# Patient Record
Sex: FEMALE | Race: WHITE | NOT HISPANIC OR LATINO | Employment: FULL TIME | URBAN - METROPOLITAN AREA
[De-identification: names, ages, dates, MRNs, and addresses within clinical notes are randomized per-mention and may not be internally consistent; named-entity substitution may affect disease eponyms.]

---

## 2018-04-12 ENCOUNTER — HISTORICAL (OUTPATIENT)
Dept: ADMINISTRATIVE | Facility: HOSPITAL | Age: 58
End: 2018-04-12

## 2018-04-12 ENCOUNTER — HISTORICAL (OUTPATIENT)
Dept: LAB | Facility: HOSPITAL | Age: 58
End: 2018-04-12

## 2018-04-12 LAB
APPEARANCE, UA: CLEAR
BACTERIA #/AREA URNS AUTO: ABNORMAL /HPF
BILIRUB UR QL STRIP: NEGATIVE MG/DL
COLOR UR: ABNORMAL
GLUCOSE (UA): NEGATIVE MG/DL
HGB UR QL STRIP: ABNORMAL UNIT/L
KETONES UR QL STRIP: NEGATIVE MG/DL
LEUKOCYTE ESTERASE UR QL STRIP: NEGATIVE UNIT/L
NITRITE UR QL STRIP.AUTO: NEGATIVE
PH UR STRIP: 7 [PH]
PROT UR QL STRIP: NEGATIVE MG/DL
RBC #/AREA URNS HPF: ABNORMAL /HPF
SP GR UR STRIP: <1.005
SQUAMOUS EPITHELIAL, UA: ABNORMAL /LPF
UROBILINOGEN UR STRIP-ACNC: 0.2 MG/DL
WBC #/AREA URNS AUTO: ABNORMAL /[HPF]

## 2018-05-17 ENCOUNTER — HISTORICAL (OUTPATIENT)
Dept: ADMINISTRATIVE | Facility: HOSPITAL | Age: 58
End: 2018-05-17

## 2018-05-17 LAB
ABS NEUT (OLG): 4.9
ALBUMIN SERPL-MCNC: 4.3 GM/DL (ref 3.4–5)
ALBUMIN/GLOB SERPL: 2.15 {RATIO} (ref 1.5–2.5)
ALP SERPL-CCNC: 71 UNIT/L (ref 38–126)
ALT SERPL-CCNC: 20 UNIT/L (ref 7–52)
AST SERPL-CCNC: 19 UNIT/L (ref 15–37)
BILIRUB SERPL-MCNC: 0.8 MG/DL (ref 0.2–1)
BILIRUBIN DIRECT+TOT PNL SERPL-MCNC: 0.2 MG/DL (ref 0–0.5)
BILIRUBIN DIRECT+TOT PNL SERPL-MCNC: 0.6 MG/DL
BUN SERPL-MCNC: 11 MG/DL (ref 7–18)
CALCIUM SERPL-MCNC: 9.2 MG/DL (ref 8.5–10)
CHLORIDE SERPL-SCNC: 105 MMOL/L (ref 98–107)
CHOLEST SERPL-MCNC: 158 MG/DL (ref 0–200)
CHOLEST/HDLC SERPL: 4 {RATIO}
CO2 SERPL-SCNC: 30 MMOL/L (ref 21–32)
CREAT SERPL-MCNC: 0.74 MG/DL (ref 0.6–1.3)
CREAT UR-MCNC: 300 MG/DL
ERYTHROCYTE [DISTWIDTH] IN BLOOD BY AUTOMATED COUNT: 12.5 % (ref 11.5–17)
EST. AVERAGE GLUCOSE BLD GHB EST-MCNC: 140 MG/DL
GLOBULIN SER-MCNC: 2 GM/DL (ref 1.2–3)
GLUCOSE SERPL-MCNC: 157 MG/DL (ref 74–106)
HBA1C MFR BLD: 6.5 % (ref 4.4–6.4)
HCT VFR BLD AUTO: 46.4 % (ref 37–47)
HDLC SERPL-MCNC: 40 MG/DL (ref 35–60)
HGB BLD-MCNC: 15.5 GM/DL (ref 12–16)
LDLC SERPL CALC-MCNC: 100 MG/DL (ref 0–129)
LYMPHOCYTES # BLD AUTO: 2.3 X10(3)/MCL (ref 0.6–3.4)
LYMPHOCYTES NFR BLD AUTO: 29.9 % (ref 13–40)
MCH RBC QN AUTO: 31.2 PG (ref 27–31.2)
MCHC RBC AUTO-ENTMCNC: 33 GM/DL (ref 32–36)
MCV RBC AUTO: 93 FL (ref 80–94)
MICROALBUMIN UR-MCNC: 10 MG/L
MICROALBUMIN/CREAT RATIO PNL UR: <30 MG/GM
MONOCYTES # BLD AUTO: 0.5 X10(3)/MCL (ref 0–1.8)
MONOCYTES NFR BLD AUTO: 6.7 % (ref 0.1–24)
NEUTROPHILS NFR BLD AUTO: 63.4 % (ref 47–80)
PLATELET # BLD AUTO: 198 X10(3)/MCL (ref 130–400)
PMV BLD AUTO: 10.8 FL
POTASSIUM SERPL-SCNC: 5.4 MMOL/L (ref 3.5–5.1)
PROT SERPL-MCNC: 6.3 GM/DL (ref 6.4–8.2)
RBC # BLD AUTO: 4.97 X10(6)/MCL (ref 4.2–5.4)
SODIUM SERPL-SCNC: 139 MMOL/L (ref 136–145)
TRIGL SERPL-MCNC: 117 MG/DL (ref 30–150)
TSH SERPL-ACNC: 1.34 MIU/ML (ref 0.35–4.94)
VLDLC SERPL CALC-MCNC: 23.4 MG/DL
WBC # SPEC AUTO: 7.7 X10(3)/MCL (ref 4.5–11.5)

## 2018-07-12 ENCOUNTER — HISTORICAL (OUTPATIENT)
Dept: RADIOLOGY | Facility: HOSPITAL | Age: 58
End: 2018-07-12

## 2018-07-12 ENCOUNTER — HISTORICAL (OUTPATIENT)
Dept: ADMINISTRATIVE | Facility: HOSPITAL | Age: 58
End: 2018-07-12

## 2018-07-12 LAB
BUN SERPL-MCNC: 14 MG/DL (ref 7–18)
CALCIUM SERPL-MCNC: 9.2 MG/DL (ref 8.5–10)
CHLORIDE SERPL-SCNC: 103 MMOL/L (ref 98–107)
CO2 SERPL-SCNC: 30 MMOL/L (ref 21–32)
CREAT SERPL-MCNC: 0.69 MG/DL (ref 0.6–1.3)
CREAT/UREA NIT SERPL: 20.3
GLUCOSE SERPL-MCNC: 143 MG/DL (ref 74–106)
POTASSIUM SERPL-SCNC: 4.8 MMOL/L (ref 3.5–5.1)
SODIUM SERPL-SCNC: 138 MMOL/L (ref 136–145)

## 2018-10-16 ENCOUNTER — HISTORICAL (OUTPATIENT)
Dept: ADMINISTRATIVE | Facility: HOSPITAL | Age: 58
End: 2018-10-16

## 2018-10-16 ENCOUNTER — HISTORICAL (OUTPATIENT)
Dept: LAB | Facility: HOSPITAL | Age: 58
End: 2018-10-16

## 2018-10-16 LAB
APPEARANCE, UA: ABNORMAL
BACTERIA #/AREA URNS AUTO: ABNORMAL /HPF
BILIRUB UR QL STRIP: ABNORMAL MG/DL
COLOR UR: YELLOW
EST. AVERAGE GLUCOSE BLD GHB EST-MCNC: 151 MG/DL
GLUCOSE (UA): NEGATIVE MG/DL
HBA1C MFR BLD: 6.9 % (ref 4.4–6.4)
HGB UR QL STRIP: ABNORMAL UNIT/L
KETONES UR QL STRIP: NEGATIVE MG/DL
LEUKOCYTE ESTERASE UR QL STRIP: ABNORMAL UNIT/L
NITRITE UR QL STRIP.AUTO: NEGATIVE
PH UR STRIP: 5 [PH]
PROT UR QL STRIP: ABNORMAL MG/DL
RBC #/AREA URNS HPF: ABNORMAL /HPF
SP GR UR STRIP: >1.03
SQUAMOUS EPITHELIAL, UA: ABNORMAL /LPF
TSH SERPL-ACNC: 1.51 MIU/ML (ref 0.35–4.94)
UROBILINOGEN UR STRIP-ACNC: 0.2 MG/DL
WBC #/AREA URNS AUTO: ABNORMAL /[HPF]

## 2018-12-10 ENCOUNTER — HISTORICAL (OUTPATIENT)
Dept: ADMINISTRATIVE | Facility: HOSPITAL | Age: 58
End: 2018-12-10

## 2018-12-10 LAB — POC CREATININE: 0.7 MG/DL (ref 0.6–1.3)

## 2019-09-16 ENCOUNTER — HISTORICAL (OUTPATIENT)
Dept: ADMINISTRATIVE | Facility: HOSPITAL | Age: 59
End: 2019-09-16

## 2019-09-16 LAB
ABS NEUT (OLG): 6.7 X10(3)/MCL (ref 2.1–9.2)
ALBUMIN SERPL-MCNC: 4.5 GM/DL (ref 3.4–5)
ALBUMIN/GLOB SERPL: 1.96 {RATIO} (ref 1.5–2.5)
ALP SERPL-CCNC: 81 UNIT/L (ref 38–126)
ALT SERPL-CCNC: 16 UNIT/L (ref 7–52)
AST SERPL-CCNC: 16 UNIT/L (ref 15–37)
BILIRUB SERPL-MCNC: 0.5 MG/DL (ref 0.2–1)
BILIRUBIN DIRECT+TOT PNL SERPL-MCNC: 0.1 MG/DL (ref 0–0.5)
BILIRUBIN DIRECT+TOT PNL SERPL-MCNC: 0.4 MG/DL
BUN SERPL-MCNC: 13 MG/DL (ref 7–18)
CALCIUM SERPL-MCNC: 9.7 MG/DL (ref 8.5–10)
CHLORIDE SERPL-SCNC: 103 MMOL/L (ref 98–107)
CHOLEST SERPL-MCNC: 170 MG/DL (ref 0–200)
CHOLEST/HDLC SERPL: 3.9 {RATIO}
CO2 SERPL-SCNC: 28 MMOL/L (ref 21–32)
CREAT SERPL-MCNC: 0.81 MG/DL (ref 0.6–1.3)
ERYTHROCYTE [DISTWIDTH] IN BLOOD BY AUTOMATED COUNT: 12.1 % (ref 11.5–17)
EST. AVERAGE GLUCOSE BLD GHB EST-MCNC: 137 MG/DL
GLOBULIN SER-MCNC: 2.3 GM/DL (ref 1.2–3)
GLUCOSE SERPL-MCNC: 156 MG/DL (ref 74–106)
HBA1C MFR BLD: 6.4 % (ref 4.4–6.4)
HCT VFR BLD AUTO: 47.3 % (ref 37–47)
HDLC SERPL-MCNC: 44 MG/DL (ref 35–60)
HGB BLD-MCNC: 16.1 GM/DL (ref 12–16)
LDLC SERPL CALC-MCNC: 128 MG/DL (ref 0–129)
LYMPHOCYTES # BLD AUTO: 3 X10(3)/MCL (ref 0.6–3.4)
LYMPHOCYTES NFR BLD AUTO: 29.5 % (ref 13–40)
MCH RBC QN AUTO: 30.2 PG (ref 27–31.2)
MCHC RBC AUTO-ENTMCNC: 34 GM/DL (ref 32–36)
MCV RBC AUTO: 89 FL (ref 80–94)
MONOCYTES # BLD AUTO: 0.6 X10(3)/MCL (ref 0.1–1.3)
MONOCYTES NFR BLD AUTO: 6 % (ref 0.1–24)
NEUTROPHILS NFR BLD AUTO: 64.5 % (ref 47–80)
PLATELET # BLD AUTO: 179 X10(3)/MCL (ref 130–400)
PMV BLD AUTO: 12.2 FL (ref 9.4–12.4)
POTASSIUM SERPL-SCNC: 5.3 MMOL/L (ref 3.5–5.1)
PROT SERPL-MCNC: 6.8 GM/DL (ref 6.4–8.2)
RBC # BLD AUTO: 5.33 X10(6)/MCL (ref 4.2–5.4)
SODIUM SERPL-SCNC: 141 MMOL/L (ref 136–145)
TRIGL SERPL-MCNC: 121 MG/DL (ref 30–150)
TSH SERPL-ACNC: 1.61 MIU/ML (ref 0.35–4.94)
VLDLC SERPL CALC-MCNC: 24.2 MG/DL
WBC # SPEC AUTO: 10.3 X10(3)/MCL (ref 4.5–11.5)

## 2019-09-27 ENCOUNTER — HISTORICAL (OUTPATIENT)
Dept: RADIOLOGY | Facility: HOSPITAL | Age: 59
End: 2019-09-27

## 2019-09-27 ENCOUNTER — HISTORICAL (OUTPATIENT)
Dept: ADMINISTRATIVE | Facility: HOSPITAL | Age: 59
End: 2019-09-27

## 2019-09-27 LAB
ABS NEUT (OLG): 6.5 X10(3)/MCL (ref 2.1–9.2)
CREAT UR-MCNC: 200 MG/DL
ERYTHROCYTE [DISTWIDTH] IN BLOOD BY AUTOMATED COUNT: 12.4 % (ref 11.5–17)
HCT VFR BLD AUTO: 46.8 % (ref 37–47)
HGB BLD-MCNC: 15.9 GM/DL (ref 12–16)
LYMPHOCYTES # BLD AUTO: 3.1 X10(3)/MCL (ref 0.6–3.4)
LYMPHOCYTES NFR BLD AUTO: 31.6 % (ref 13–40)
MCH RBC QN AUTO: 30.1 PG (ref 27–31.2)
MCHC RBC AUTO-ENTMCNC: 34 GM/DL (ref 32–36)
MCV RBC AUTO: 88 FL (ref 80–94)
MICROALBUMIN UR-MCNC: 30 MG/L
MICROALBUMIN/CREAT RATIO PNL UR: <30 MG/GM
MONOCYTES # BLD AUTO: 0.3 X10(3)/MCL (ref 0.1–1.3)
MONOCYTES NFR BLD AUTO: 3.1 % (ref 0.1–24)
NEUTROPHILS NFR BLD AUTO: 65.3 % (ref 47–80)
PLATELET # BLD AUTO: 201 X10(3)/MCL (ref 130–400)
PMV BLD AUTO: 11.1 FL (ref 9.4–12.4)
POTASSIUM SERPL-SCNC: 4.4 MMOL/L (ref 3.5–5.1)
RBC # BLD AUTO: 5.29 X10(6)/MCL (ref 4.2–5.4)
WBC # SPEC AUTO: 9.9 X10(3)/MCL (ref 4.5–11.5)

## 2020-05-06 ENCOUNTER — HISTORICAL (OUTPATIENT)
Dept: LAB | Facility: HOSPITAL | Age: 60
End: 2020-05-06

## 2020-05-13 ENCOUNTER — HISTORICAL (OUTPATIENT)
Dept: ADMINISTRATIVE | Facility: HOSPITAL | Age: 60
End: 2020-05-13

## 2020-05-13 LAB
ALBUMIN SERPL-MCNC: 4.5 GM/DL (ref 3.4–5)
ALBUMIN/GLOB SERPL: 1.88 {RATIO} (ref 1.5–2.5)
ALP SERPL-CCNC: 95 UNIT/L (ref 38–126)
ALT SERPL-CCNC: 19 UNIT/L (ref 7–52)
AST SERPL-CCNC: 20 UNIT/L (ref 15–37)
BILIRUB SERPL-MCNC: 0.7 MG/DL (ref 0.2–1)
BILIRUBIN DIRECT+TOT PNL SERPL-MCNC: 0.1 MG/DL (ref 0–0.5)
BILIRUBIN DIRECT+TOT PNL SERPL-MCNC: 0.6 MG/DL
BUN SERPL-MCNC: 13 MG/DL (ref 7–18)
CALCIUM SERPL-MCNC: 9.8 MG/DL (ref 8.5–10)
CHLORIDE SERPL-SCNC: 103 MMOL/L (ref 98–107)
CO2 SERPL-SCNC: 31 MMOL/L (ref 21–32)
CREAT SERPL-MCNC: 0.74 MG/DL (ref 0.6–1.3)
EST. AVERAGE GLUCOSE BLD GHB EST-MCNC: 148 MG/DL
GLOBULIN SER-MCNC: 2.4 GM/DL (ref 1.2–3)
GLUCOSE SERPL-MCNC: 151 MG/DL (ref 74–106)
HBA1C MFR BLD: 6.8 % (ref 4.4–6.4)
POTASSIUM SERPL-SCNC: 5.1 MMOL/L (ref 3.5–5.1)
PROT SERPL-MCNC: 6.9 GM/DL (ref 6.4–8.2)
SODIUM SERPL-SCNC: 141 MMOL/L (ref 136–145)

## 2020-07-15 LAB — SARS-COV-2 RNA RESP QL NAA+PROBE: NOT DETECTED

## 2020-07-16 ENCOUNTER — HISTORICAL (OUTPATIENT)
Dept: LAB | Facility: HOSPITAL | Age: 60
End: 2020-07-16

## 2020-11-19 ENCOUNTER — HISTORICAL (OUTPATIENT)
Dept: ADMINISTRATIVE | Facility: HOSPITAL | Age: 60
End: 2020-11-19

## 2020-11-19 LAB
ABS NEUT (OLG): 5.2 X10(3)/MCL (ref 2.1–9.2)
ALBUMIN SERPL-MCNC: 4.4 GM/DL (ref 3.4–5)
ALBUMIN/GLOB SERPL: 2 {RATIO} (ref 1.5–2.5)
ALP SERPL-CCNC: 87 UNIT/L (ref 38–126)
ALT SERPL-CCNC: 25 UNIT/L (ref 7–52)
AST SERPL-CCNC: 27 UNIT/L (ref 15–37)
BILIRUB SERPL-MCNC: 0.8 MG/DL (ref 0.2–1)
BILIRUBIN DIRECT+TOT PNL SERPL-MCNC: 0.2 MG/DL (ref 0–0.5)
BILIRUBIN DIRECT+TOT PNL SERPL-MCNC: 0.6 MG/DL
BUN SERPL-MCNC: 9 MG/DL (ref 7–18)
CALCIUM SERPL-MCNC: 10.1 MG/DL (ref 8.5–10)
CHLORIDE SERPL-SCNC: 103 MMOL/L (ref 98–107)
CHOLEST SERPL-MCNC: 148 MG/DL (ref 0–200)
CHOLEST/HDLC SERPL: 4.2 {RATIO}
CO2 SERPL-SCNC: 31 MMOL/L (ref 21–32)
CREAT SERPL-MCNC: 0.74 MG/DL (ref 0.6–1.3)
CREAT UR-MCNC: 300 MG/DL
ERYTHROCYTE [DISTWIDTH] IN BLOOD BY AUTOMATED COUNT: 12.4 % (ref 11.5–17)
EST. AVERAGE GLUCOSE BLD GHB EST-MCNC: 134 MG/DL
GLOBULIN SER-MCNC: 2.2 GM/DL (ref 1.2–3)
GLUCOSE SERPL-MCNC: 144 MG/DL (ref 74–106)
HBA1C MFR BLD: 6.3 % (ref 4.4–6.4)
HCT VFR BLD AUTO: 45.5 % (ref 37–47)
HDLC SERPL-MCNC: 35 MG/DL (ref 35–60)
HGB BLD-MCNC: 15.7 GM/DL (ref 12–16)
LDLC SERPL CALC-MCNC: 112 MG/DL (ref 0–129)
LYMPHOCYTES # BLD AUTO: 1.9 X10(3)/MCL (ref 0.6–3.4)
LYMPHOCYTES NFR BLD AUTO: 25 % (ref 13–40)
MCH RBC QN AUTO: 31.2 PG (ref 27–31.2)
MCHC RBC AUTO-ENTMCNC: 34 GM/DL (ref 32–36)
MCV RBC AUTO: 90 FL (ref 80–94)
MICROALBUMIN UR-MCNC: 10 MG/L
MICROALBUMIN/CREAT RATIO PNL UR: <30 MG/GM
MONOCYTES # BLD AUTO: 0.4 X10(3)/MCL (ref 0.1–1.3)
MONOCYTES NFR BLD AUTO: 5.7 % (ref 0.1–24)
NEUTROPHILS NFR BLD AUTO: 69.3 % (ref 47–80)
PLATELET # BLD AUTO: 210 X10(3)/MCL (ref 130–400)
PMV BLD AUTO: 11.9 FL (ref 9.4–12.4)
POTASSIUM SERPL-SCNC: 5.1 MMOL/L (ref 3.5–5.1)
PROT SERPL-MCNC: 6.6 GM/DL (ref 6.4–8.2)
RBC # BLD AUTO: 5.04 X10(6)/MCL (ref 4.2–5.4)
SODIUM SERPL-SCNC: 140 MMOL/L (ref 136–145)
TRIGL SERPL-MCNC: 101 MG/DL (ref 30–150)
TSH SERPL-ACNC: 1.1 MIU/ML (ref 0.35–4.94)
VLDLC SERPL CALC-MCNC: 20.2 MG/DL
WBC # SPEC AUTO: 7.5 X10(3)/MCL (ref 4.5–11.5)

## 2021-05-12 ENCOUNTER — HISTORICAL (OUTPATIENT)
Dept: ADMINISTRATIVE | Facility: HOSPITAL | Age: 61
End: 2021-05-12

## 2021-05-12 LAB
ALBUMIN SERPL-MCNC: 4.6 GM/DL (ref 3.4–5)
ALBUMIN/GLOB SERPL: 2.09 {RATIO} (ref 1.5–2.5)
ALP SERPL-CCNC: 85 UNIT/L (ref 38–126)
ALT SERPL-CCNC: 16 UNIT/L (ref 7–52)
AST SERPL-CCNC: 17 UNIT/L (ref 15–37)
BILIRUB SERPL-MCNC: 0.6 MG/DL (ref 0.2–1)
BILIRUBIN DIRECT+TOT PNL SERPL-MCNC: 0.2 MG/DL (ref 0–0.5)
BILIRUBIN DIRECT+TOT PNL SERPL-MCNC: 0.4 MG/DL
BUN SERPL-MCNC: 13 MG/DL (ref 7–18)
CALCIUM SERPL-MCNC: 9.8 MG/DL (ref 8.5–10)
CHLORIDE SERPL-SCNC: 103 MMOL/L (ref 98–107)
CO2 SERPL-SCNC: 30 MMOL/L (ref 21–32)
CREAT SERPL-MCNC: 0.84 MG/DL (ref 0.6–1.3)
EST. AVERAGE GLUCOSE BLD GHB EST-MCNC: 151 MG/DL
GLOBULIN SER-MCNC: 2.2 GM/DL (ref 1.2–3)
GLUCOSE SERPL-MCNC: 115 MG/DL (ref 74–106)
HBA1C MFR BLD: 6.9 % (ref 4.4–6.4)
POTASSIUM SERPL-SCNC: 5.2 MMOL/L (ref 3.5–5.1)
PROT SERPL-MCNC: 6.8 GM/DL (ref 6.4–8.2)
SODIUM SERPL-SCNC: 140 MMOL/L (ref 136–145)

## 2021-11-22 ENCOUNTER — HISTORICAL (OUTPATIENT)
Dept: ADMINISTRATIVE | Facility: HOSPITAL | Age: 61
End: 2021-11-22

## 2021-11-22 LAB
ABS NEUT (OLG): 4.8 X10(3)/MCL (ref 2.1–9.2)
ALBUMIN SERPL-MCNC: 4.5 GM/DL (ref 3.4–5)
ALBUMIN/GLOB SERPL: 1.96 {RATIO} (ref 1.5–2.5)
ALP SERPL-CCNC: 74 UNIT/L (ref 38–126)
ALT SERPL-CCNC: 16 UNIT/L (ref 7–52)
AST SERPL-CCNC: 19 UNIT/L (ref 15–37)
BILIRUB SERPL-MCNC: 0.8 MG/DL (ref 0.2–1)
BILIRUBIN DIRECT+TOT PNL SERPL-MCNC: 0.2 MG/DL (ref 0–0.5)
BILIRUBIN DIRECT+TOT PNL SERPL-MCNC: 0.6 MG/DL
BUN SERPL-MCNC: 14 MG/DL (ref 7–18)
CALCIUM SERPL-MCNC: 9.9 MG/DL (ref 8.5–10)
CHLORIDE SERPL-SCNC: 104 MMOL/L (ref 98–107)
CHOLEST SERPL-MCNC: 163 MG/DL (ref 0–200)
CHOLEST/HDLC SERPL: 4.5 {RATIO}
CO2 SERPL-SCNC: 29 MMOL/L (ref 21–32)
CREAT SERPL-MCNC: 0.81 MG/DL (ref 0.6–1.3)
CREAT UR-MCNC: 200 MG/DL
ERYTHROCYTE [DISTWIDTH] IN BLOOD BY AUTOMATED COUNT: 12.1 % (ref 11.5–17)
EST. AVERAGE GLUCOSE BLD GHB EST-MCNC: 148 MG/DL
GLOBULIN SER-MCNC: 2.3 GM/DL (ref 1.2–3)
GLUCOSE SERPL-MCNC: 146 MG/DL (ref 74–106)
HBA1C MFR BLD: 6.8 % (ref 4.4–6.4)
HBV SURFACE AB SER-ACNC: 10.21 MIU/ML
HBV SURFACE AB SERPL IA-ACNC: ABNORMAL M[IU]/ML
HCT VFR BLD AUTO: 45.6 % (ref 37–47)
HDLC SERPL-MCNC: 36 MG/DL (ref 35–60)
HGB BLD-MCNC: 15.6 GM/DL (ref 12–16)
LDLC SERPL CALC-MCNC: 109 MG/DL (ref 0–129)
LYMPHOCYTES # BLD AUTO: 2.4 X10(3)/MCL (ref 0.6–3.4)
LYMPHOCYTES NFR BLD AUTO: 31.8 % (ref 13–40)
MCH RBC QN AUTO: 30.8 PG (ref 27–31.2)
MCHC RBC AUTO-ENTMCNC: 34 GM/DL (ref 32–36)
MCV RBC AUTO: 90 FL (ref 80–94)
MICROALBUMIN UR-MCNC: 10 MG/L
MICROALBUMIN/CREAT RATIO PNL UR: <30 MG/GM
MONOCYTES # BLD AUTO: 0.3 X10(3)/MCL (ref 0.1–1.3)
MONOCYTES NFR BLD AUTO: 4.2 % (ref 0.1–24)
NEUTROPHILS NFR BLD AUTO: 64 % (ref 47–80)
PLATELET # BLD AUTO: 208 X10(3)/MCL (ref 130–400)
PMV BLD AUTO: 11.3 FL (ref 9.4–12.4)
POTASSIUM SERPL-SCNC: 5.9 MMOL/L (ref 3.5–5.1)
PROT SERPL-MCNC: 6.8 GM/DL (ref 6.4–8.2)
RBC # BLD AUTO: 5.06 X10(6)/MCL (ref 4.2–5.4)
SODIUM SERPL-SCNC: 141 MMOL/L (ref 136–145)
TRIGL SERPL-MCNC: 125 MG/DL (ref 30–150)
TSH SERPL-ACNC: 1.82 MIU/ML (ref 0.35–4.94)
VLDLC SERPL CALC-MCNC: 25 MG/DL
WBC # SPEC AUTO: 7.5 X10(3)/MCL (ref 4.5–11.5)

## 2021-11-30 ENCOUNTER — HISTORICAL (OUTPATIENT)
Dept: RADIOLOGY | Facility: HOSPITAL | Age: 61
End: 2021-11-30

## 2021-12-07 ENCOUNTER — HISTORICAL (OUTPATIENT)
Dept: ADMINISTRATIVE | Facility: HOSPITAL | Age: 61
End: 2021-12-07

## 2021-12-07 LAB — POTASSIUM SERPL-SCNC: 5.5 MMOL/L (ref 3.5–5.1)

## 2022-01-12 ENCOUNTER — HISTORICAL (OUTPATIENT)
Dept: ADMINISTRATIVE | Facility: HOSPITAL | Age: 62
End: 2022-01-12

## 2022-01-12 LAB
BUN SERPL-MCNC: 14 MG/DL (ref 7–18)
CALCIUM SERPL-MCNC: 10.1 MG/DL (ref 8.5–10)
CHLORIDE SERPL-SCNC: 104 MMOL/L (ref 98–107)
CO2 SERPL-SCNC: 32 MMOL/L (ref 21–32)
CREAT SERPL-MCNC: 0.78 MG/DL (ref 0.6–1.3)
CREAT/UREA NIT SERPL: 17.9
GLUCOSE SERPL-MCNC: 150 MG/DL (ref 74–106)
POTASSIUM SERPL-SCNC: 5.6 MMOL/L (ref 3.5–5.1)
SODIUM SERPL-SCNC: 142 MMOL/L (ref 136–145)

## 2022-01-13 ENCOUNTER — HISTORICAL (OUTPATIENT)
Dept: ADMINISTRATIVE | Facility: HOSPITAL | Age: 62
End: 2022-01-13

## 2022-01-13 LAB
CORTIS SERPL-SCNC: 6.6 UG/DL
OSMOLALITY SERPL: 299 MOSM/KG (ref 280–300)
OSMOLALITY UR: 926 MOSM/KG (ref 300–1300)
POTASSIUM SERPL-SCNC: 4.6 MMOL/L (ref 3.5–5.1)
POTASSIUM UR-SCNC: 64 MMOL/L

## 2022-01-20 ENCOUNTER — PATIENT MESSAGE (OUTPATIENT)
Dept: ADMINISTRATIVE | Facility: OTHER | Age: 62
End: 2022-01-20

## 2022-01-20 PROBLEM — E11.9 TYPE 2 DIABETES MELLITUS: Status: ACTIVE | Noted: 2022-01-20

## 2022-01-20 PROBLEM — F41.1 GENERALIZED ANXIETY DISORDER: Status: ACTIVE | Noted: 2022-01-20

## 2022-01-20 PROBLEM — Z72.0 TOBACCO USER: Status: ACTIVE | Noted: 2022-01-20

## 2022-01-20 PROBLEM — M19.031 OSTEOARTHRITIS OF RIGHT WRIST: Status: ACTIVE | Noted: 2022-01-20

## 2022-04-11 ENCOUNTER — HISTORICAL (OUTPATIENT)
Dept: ADMINISTRATIVE | Facility: HOSPITAL | Age: 62
End: 2022-04-11

## 2022-04-13 ENCOUNTER — PATIENT MESSAGE (OUTPATIENT)
Dept: OTHER | Facility: OTHER | Age: 62
End: 2022-04-13

## 2022-04-25 VITALS
OXYGEN SATURATION: 96 % | DIASTOLIC BLOOD PRESSURE: 78 MMHG | WEIGHT: 255.31 LBS | HEIGHT: 70 IN | BODY MASS INDEX: 36.55 KG/M2 | SYSTOLIC BLOOD PRESSURE: 132 MMHG

## 2022-05-23 PROBLEM — G89.29 CHRONIC PAIN OF BOTH SHOULDERS: Status: ACTIVE | Noted: 2022-05-23

## 2022-05-23 PROBLEM — F32.A DEPRESSION: Status: ACTIVE | Noted: 2022-05-23

## 2022-05-23 PROBLEM — M25.511 CHRONIC PAIN OF BOTH SHOULDERS: Status: ACTIVE | Noted: 2022-05-23

## 2022-05-23 PROBLEM — M25.559 ARTHRALGIA OF HIP: Status: ACTIVE | Noted: 2022-05-23

## 2022-05-23 PROBLEM — Z72.0 TOBACCO USER: Status: RESOLVED | Noted: 2022-01-20 | Resolved: 2022-05-23

## 2022-05-23 PROBLEM — M25.512 CHRONIC PAIN OF BOTH SHOULDERS: Status: ACTIVE | Noted: 2022-05-23

## 2022-05-23 PROBLEM — E87.5 HYPERKALEMIA: Status: ACTIVE | Noted: 2022-05-23

## 2022-05-23 PROBLEM — E66.01 SEVERE OBESITY (BMI 35.0-39.9) WITH COMORBIDITY: Status: ACTIVE | Noted: 2022-05-23

## 2022-07-25 ENCOUNTER — OCCUPATIONAL HEALTH (OUTPATIENT)
Dept: URGENT CARE | Facility: CLINIC | Age: 62
End: 2022-07-25
Payer: COMMERCIAL

## 2022-07-25 DIAGNOSIS — R05.9 COUGH: Primary | ICD-10-CM

## 2022-07-25 DIAGNOSIS — U07.1 COVID-19 VIRUS DETECTED: ICD-10-CM

## 2022-07-25 DIAGNOSIS — R05.9 COUGH: ICD-10-CM

## 2022-07-25 LAB
CTP QC/QA: YES
SARS-COV-2 RDRP RESP QL NAA+PROBE: POSITIVE

## 2022-07-25 PROCEDURE — U0002 COVID-19 LAB TEST NON-CDC: HCPCS | Mod: PBBFAC

## 2022-07-25 PROCEDURE — 99211 OFF/OP EST MAY X REQ PHY/QHP: CPT | Mod: PBBFAC

## 2022-07-25 NOTE — PROGRESS NOTES
Pt presented through [facility] for COVID screening. Swabbed, testing ran, resulted. Results printed and handed to [facility].

## 2022-07-26 ENCOUNTER — NURSE TRIAGE (OUTPATIENT)
Dept: ADMINISTRATIVE | Facility: CLINIC | Age: 62
End: 2022-07-26
Payer: COMMERCIAL

## 2022-07-26 NOTE — TELEPHONE ENCOUNTER
OOC RN [facility]SM COVID POSITIVE 7/25/22    Fever running 101-102.  But can break fever with tylenol.  Coughing, drinking. Tea.   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  For any new or worsening.  Symptoms to call back OOC RN.  Patient VU.          Reason for Disposition   [1] COVID-19 diagnosed by positive lab test (e.g., PCR, rapid self-test kit) AND [2] mild symptoms (e.g., cough, fever, others) AND [3] no complications or SOB    Additional Information   Negative: SEVERE difficulty breathing (e.g., struggling for each breath, speaks in single words)   Negative: Difficult to awaken or acting confused (e.g., disoriented, slurred speech)   Negative: Bluish (or gray) lips or face now   Negative: Shock suspected (e.g., cold/pale/clammy skin, too weak to stand, low BP, rapid pulse)   Negative: Sounds like a life-threatening emergency to the triager   Negative: SEVERE or constant chest pain or pressure  (Exception: Mild central chest pain, present only when coughing.)   Negative: MODERATE difficulty breathing (e.g., speaks in phrases, SOB even at rest, pulse 100-120)   Negative: Headache and stiff neck (can't touch chin to chest)   Negative: Oxygen level (e.g., pulse oximetry) 90 percent or lower   Negative: Chest pain or pressure   Negative: Patient sounds very sick or weak to the triager   Negative: MILD difficulty breathing (e.g., minimal/no SOB at rest, SOB with walking, pulse <100)   Negative: Fever > 103 F (39.4 C)   Negative: [1] Fever > 101 F (38.3 C) AND [2] over 60 years of age   Negative: [1] Fever > 100.0 F (37.8 C) AND [2] bedridden (e.g., nursing home patient, CVA, chronic illness, recovering from surgery)   Negative: HIGH RISK for severe COVID complications (e.g., weak immune system, age > 64 years, obesity with BMI > 25, pregnant, chronic lung disease or other chronic medical condition) (Exception: Already seen by PCP and no new or worsening symptoms.)   Negative: [1] HIGH  RISK patient AND [2] influenza is widespread in the community AND [3] ONE OR MORE respiratory symptoms: cough, sore throat, runny or stuffy nose   Negative: [1] HIGH RISK patient AND [2] influenza exposure within the last 7 days AND [3] ONE OR MORE respiratory symptoms: cough, sore throat, runny or stuffy nose   Negative: Oxygen level (e.g., pulse oximetry) 91 to 94 percent   Negative: [1] COVID-19 infection suspected by caller or triager AND [2] mild symptoms (cough, fever, or others) AND [3] negative COVID-19 rapid test   Negative: Fever present > 3 days (72 hours)   Negative: [1] Fever returns after gone for over 24 hours AND [2] symptoms worse or not improved   Negative: [1] Continuous (nonstop) coughing interferes with work or school AND [2] no improvement using cough treatment per Care Advice   Negative: Cough present > 3 weeks   Negative: [1] COVID-19 diagnosed by positive lab test (e.g., PCR, rapid self-test kit) AND [2] NO symptoms (e.g., cough, fever, others)    Protocols used: CORONAVIRUS (COVID-19) DIAGNOSED OR [id number]

## 2022-11-10 ENCOUNTER — OFFICE VISIT (OUTPATIENT)
Dept: ORTHOPEDICS | Facility: CLINIC | Age: 62
End: 2022-11-10
Payer: COMMERCIAL

## 2022-11-10 VITALS — BODY MASS INDEX: 35 KG/M2 | WEIGHT: 250 LBS | HEIGHT: 71 IN

## 2022-11-10 DIAGNOSIS — M25.512 CHRONIC LEFT SHOULDER PAIN: ICD-10-CM

## 2022-11-10 DIAGNOSIS — M75.112 NONTRAUMATIC INCOMPLETE TEAR OF LEFT ROTATOR CUFF: Primary | ICD-10-CM

## 2022-11-10 DIAGNOSIS — M19.012 ARTHROSIS OF LEFT ACROMIOCLAVICULAR JOINT: ICD-10-CM

## 2022-11-10 DIAGNOSIS — G89.29 CHRONIC LEFT SHOULDER PAIN: ICD-10-CM

## 2022-11-10 PROCEDURE — 1159F MED LIST DOCD IN RCRD: CPT | Mod: CPTII,,, | Performed by: ORTHOPAEDIC SURGERY

## 2022-11-10 PROCEDURE — 1160F RVW MEDS BY RX/DR IN RCRD: CPT | Mod: CPTII,,, | Performed by: ORTHOPAEDIC SURGERY

## 2022-11-10 PROCEDURE — 1160F PR REVIEW ALL MEDS BY PRESCRIBER/CLIN PHARMACIST DOCUMENTED: ICD-10-PCS | Mod: CPTII,,, | Performed by: ORTHOPAEDIC SURGERY

## 2022-11-10 PROCEDURE — 99204 OFFICE O/P NEW MOD 45 MIN: CPT | Mod: ,,, | Performed by: ORTHOPAEDIC SURGERY

## 2022-11-10 PROCEDURE — 3008F BODY MASS INDEX DOCD: CPT | Mod: CPTII,,, | Performed by: ORTHOPAEDIC SURGERY

## 2022-11-10 PROCEDURE — 1159F PR MEDICATION LIST DOCUMENTED IN MEDICAL RECORD: ICD-10-PCS | Mod: CPTII,,, | Performed by: ORTHOPAEDIC SURGERY

## 2022-11-10 PROCEDURE — 3008F PR BODY MASS INDEX (BMI) DOCUMENTED: ICD-10-PCS | Mod: CPTII,,, | Performed by: ORTHOPAEDIC SURGERY

## 2022-11-10 PROCEDURE — 99204 PR OFFICE/OUTPT VISIT, NEW, LEVL IV, 45-59 MIN: ICD-10-PCS | Mod: ,,, | Performed by: ORTHOPAEDIC SURGERY

## 2022-11-10 NOTE — PROGRESS NOTES
History of present illness:    This is a 61 y.o. year old female This is a patient that present today with shoulder pain.  Patient has had this shoulder pain for several months. This shoulder pain is moderate to severe. Patient reports increased pain with overhead movement. Patient reports night pain. Patient reports anterolateral shoulder pain that radiates down the arm. Patient has been treated previously with medication, bracing, injections, therapy.    Past Medical History:   Diagnosis Date    Depression     Hypertension        Past Surgical History:   Procedure Laterality Date    COLONOSCOPY  05/15/2013    Extraction of wisdom tooth      HYSTERECTOMY  1998    Hysterectomy (1998)      Repair Upper Lip, External Approach (05/18/2018)      Termination of ectopic pregnancy      TONSILLECTOMY         Current Outpatient Medications   Medication Sig    sertraline (ZOLOFT) 50 MG tablet Take 50 mg by mouth once daily.     No current facility-administered medications for this visit.       Review of patient's allergies indicates:   Allergen Reactions    Azithromycin      Other reaction(s): VOMITING    Metformin Other (See Comments)     Headache       Family History   Problem Relation Age of Onset    Thyroid disease Mother     Diabetes Mother     Atrial fibrillation Mother     Depression Mother     Osteoarthritis Mother     Kidney disease Mother     Mental illness Mother     Vision loss Mother         Macular degeneration    Osteoarthritis Father     Hypertension Father     Asthma Father     Coronary artery disease Father     Arthritis Father     Heart disease Father     Depression Sister     Asthma Sister     Mental illness Sister        Social History     Socioeconomic History    Marital status:      Spouse name: Adeel    Number of children: 0   Occupational History    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   Tobacco Use    Smoking status: Former     Packs/day: 0.50     Years: 38.00     Pack years: 19.00     Types:  "Cigarettes     Start date: 1983     Quit date: 2021     Years since quittin.9    Smokeless tobacco: Never   Substance and Sexual Activity    Alcohol use: Yes     Comment: 3 times per year    Drug use: Never    Sexual activity: Not Currently     Partners: Male     Birth control/protection: Post-menopausal       Chief Complaint:   Chief Complaint   Patient presents with    Left Shoulder - Pain    Shoulder Pain     L shoulder pain, PCP said it was a bone spur, had cortisone shot last week/helped for 24hrs, reports burning/soreness/sharp, limited ROM, pain has persisted for 4 years, icing/heating/ultram/marijuana gummies/PT(Patient is a recreational PT), unable to get vitals       Consulting Physician: Ben Evans Jr., MD      Review of Systems:    All review of systems negative except for those stated in the HPI    Examination:    Vital Signs:    Vitals:    11/10/22 0805   Weight: 113.4 kg (250 lb)   Height: 5' 11" (1.803 m)   PainSc:   8       Body mass index is 34.87 kg/m².    Physical Exam:   General: Well-developed, well-nourished.  Neuro: Alert and oriented x 3.  Psych: Normal mood and affect.  Shoulder Exam:  No obvious deformity. No medial or lateral scapula winging. Forward flexion to 170 degrees and abduction to 170 degrees. Positive empty can,  positive Whipple, Positive drop arm test, Ballard, impingement, Positive AC joint tenderness.  Positive biceps groove tenderness, Positive Dejesus´s, Yergason´s, Speed test. Negative Apprehension and Relocation test. 4/5 strength, normal skin appearance and palpable pulses distally. Sensibility normal.      Imaging: X-rays ordered and images interpreted today personally by me of previous films they demonstrate some acromioclavicular arthrosis.        Assessment: Nontraumatic incomplete tear of left rotator cuff    Chronic left shoulder pain  -     Ambulatory referral/consult to Orthopedics  -     MRI Shoulder Without Contrast Left; Future; Expected " date: 11/10/2022    Arthrosis of left acromioclavicular joint      Plan:    This patient has not improved with conservative treatment.  For this reason it is imperative that we proceed with an MRI of her left shoulder.  This will be a roadmap for surgery.            DISCLAIMER: This note may have been dictated using voice recognition software and may contain grammatical errors.     NOTE: Consult report sent to referring provider via [employer].

## 2022-11-15 PROBLEM — M25.552 LEFT HIP PAIN: Status: ACTIVE | Noted: 2022-05-23

## 2022-11-17 ENCOUNTER — HOSPITAL ENCOUNTER (OUTPATIENT)
Dept: RADIOLOGY | Facility: HOSPITAL | Age: 62
Discharge: HOME | End: 2022-11-17
Attending: ORTHOPAEDIC SURGERY
Payer: COMMERCIAL

## 2022-11-17 ENCOUNTER — OFFICE VISIT (OUTPATIENT)
Dept: ORTHOPEDICS | Facility: CLINIC | Age: 62
End: 2022-11-17
Payer: COMMERCIAL

## 2022-11-17 ENCOUNTER — ANESTHESIA EVENT (OUTPATIENT)
Dept: SURGERY | Facility: HOSPITAL | Age: 62
End: 2022-11-17
Payer: COMMERCIAL

## 2022-11-17 VITALS — BODY MASS INDEX: 34.5 KG/M2 | HEIGHT: 70 IN | WEIGHT: 241 LBS

## 2022-11-17 DIAGNOSIS — M19.012 ARTHROSIS OF LEFT ACROMIOCLAVICULAR JOINT: ICD-10-CM

## 2022-11-17 DIAGNOSIS — M75.22 LEFT BICIPITAL TENOSYNOVITIS: ICD-10-CM

## 2022-11-17 DIAGNOSIS — M67.819 TENDINOSIS OF ROTATOR CUFF: Primary | ICD-10-CM

## 2022-11-17 DIAGNOSIS — M67.819 TENDINOSIS OF ROTATOR CUFF: ICD-10-CM

## 2022-11-17 PROCEDURE — 71046 X-RAY EXAM CHEST 2 VIEWS: CPT | Mod: TC

## 2022-11-17 PROCEDURE — 99214 PR OFFICE/OUTPT VISIT, EST, LEVL IV, 30-39 MIN: ICD-10-PCS | Mod: ,,, | Performed by: ORTHOPAEDIC SURGERY

## 2022-11-17 PROCEDURE — 1160F RVW MEDS BY RX/DR IN RCRD: CPT | Mod: CPTII,,, | Performed by: ORTHOPAEDIC SURGERY

## 2022-11-17 PROCEDURE — 1159F PR MEDICATION LIST DOCUMENTED IN MEDICAL RECORD: ICD-10-PCS | Mod: CPTII,,, | Performed by: ORTHOPAEDIC SURGERY

## 2022-11-17 PROCEDURE — 99214 OFFICE O/P EST MOD 30 MIN: CPT | Mod: ,,, | Performed by: ORTHOPAEDIC SURGERY

## 2022-11-17 PROCEDURE — 3008F PR BODY MASS INDEX (BMI) DOCUMENTED: ICD-10-PCS | Mod: CPTII,,, | Performed by: ORTHOPAEDIC SURGERY

## 2022-11-17 PROCEDURE — 1159F MED LIST DOCD IN RCRD: CPT | Mod: CPTII,,, | Performed by: ORTHOPAEDIC SURGERY

## 2022-11-17 PROCEDURE — 1160F PR REVIEW ALL MEDS BY PRESCRIBER/CLIN PHARMACIST DOCUMENTED: ICD-10-PCS | Mod: CPTII,,, | Performed by: ORTHOPAEDIC SURGERY

## 2022-11-17 PROCEDURE — 3008F BODY MASS INDEX DOCD: CPT | Mod: CPTII,,, | Performed by: ORTHOPAEDIC SURGERY

## 2022-11-17 RX ORDER — GABAPENTIN 100 MG/1
600 CAPSULE ORAL
Status: CANCELLED | OUTPATIENT
Start: 2022-11-17

## 2022-11-17 RX ORDER — ACETAMINOPHEN 500 MG
1000 TABLET ORAL
Status: CANCELLED | OUTPATIENT
Start: 2022-11-17

## 2022-11-17 RX ORDER — SCOLOPAMINE TRANSDERMAL SYSTEM 1 MG/1
1 PATCH, EXTENDED RELEASE TRANSDERMAL ONCE AS NEEDED
Status: CANCELLED | OUTPATIENT
Start: 2022-11-17 | End: 2034-04-15

## 2022-11-17 RX ORDER — NAPROXEN SODIUM 220 MG/1
162 TABLET, FILM COATED ORAL 2 TIMES DAILY
Status: DISCONTINUED | OUTPATIENT
Start: 2022-11-17 | End: 2022-11-30 | Stop reason: HOSPADM

## 2022-11-17 RX ORDER — GABAPENTIN 100 MG/1
300 CAPSULE ORAL
Status: CANCELLED | OUTPATIENT
Start: 2022-11-17

## 2022-11-17 RX ORDER — KETOROLAC TROMETHAMINE 10 MG/1
10 TABLET, FILM COATED ORAL
Status: CANCELLED | OUTPATIENT
Start: 2022-11-30 | End: 2022-11-30

## 2022-11-17 NOTE — H&P (VIEW-ONLY)
History of present illness:    This is a 61 y.o. year old female This is a patient that present today with shoulder pain.  Patient has had this shoulder pain for several months. This shoulder pain is moderate to severe. Patient reports increased pain with overhead movement. Patient reports night pain. Patient reports anterolateral shoulder pain that radiates down the arm. Patient has been treated previously with medication, bracing, injections, therapy.  11/17/2022 this patient comes in to review her left shoulder MRI demonstrates severe rotator cuff tendinosis and thinning with some severe acromioclavicular arthrosis and impingement.  She also complains of anterior shoulder pain.    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    traMADoL (ULTRAM) 50 mg tablet Take 1 tablet (50 mg total) by mouth every 6 (six) hours as needed for Pain.     Current Facility-Administered Medications   Medication    aspirin chewable tablet 162 mg       Review of patient's allergies indicates:   Allergen Reactions    Azithromycin      Other reaction(s): VOMITING    Metformin Other (See Comments)     Headache       Family History   Problem Relation Age of Onset    Thyroid disease Mother     Diabetes Mother     Atrial fibrillation Mother     Depression Mother     Osteoarthritis Mother     Kidney disease Mother     Mental illness Mother     Vision loss Mother         Macular  "degeneration    Osteoarthritis Father     Hypertension Father     Asthma Father     Coronary artery disease Father     Arthritis Father     Heart disease Father     Depression Sister     Asthma Sister     Mental illness Sister        Social History     Socioeconomic History    Marital status:      Spouse name: Adeel    Number of children: 0   Occupational History    Occupation: [employer]   Tobacco Use    Smoking status: Former     Packs/day: 0.50     Years: 38.00     Pack years: 19.00     Types: Cigarettes     Start date: 1983     Quit date: 2021     Years since quittin.9    Smokeless tobacco: Never   Substance and Sexual Activity    Alcohol use: Yes     Comment: 3 times per year    Drug use: Never    Sexual activity: Not Currently     Partners: Male     Birth control/protection: Post-menopausal       Chief Complaint:   Chief Complaint   Patient presents with    Left Shoulder - Pain    Left Hip - Pain    Results     MRI results Left shoulder    Hip Pain     would like to dicuss Left hip problems from an injury a few years ago and its started to shooting down into her knee would like to discuss that, had an XR/injection in her hip on tuesday 11/15/22       Consulting Physician: No ref. provider found      Review of Systems:    All review of systems negative except for those stated in the HPI    Examination:    Vital Signs:    Vitals:    22 0812   Weight: 109.3 kg (241 lb)   Height: 5' 10" (1.778 m)       Body mass index is 34.58 kg/m².    Physical Exam:   General: Well-developed, well-nourished.  Neuro: Alert and oriented x 3.  Psych: Normal mood and affect.  Shoulder Exam:  No obvious deformity. No medial or lateral scapula winging. Forward flexion to 170 degrees and abduction to 170 degrees. Positive empty can,  positive Whipple, Positive drop arm test, Ballard, impingement, Positive AC joint tenderness.  Positive biceps groove tenderness, Positive Dejesus´s, Yergason´s, Speed " test. Negative Apprehension and Relocation test. 4/5 strength, normal skin appearance and palpable pulses distally. Sensibility normal.      Imaging: X-rays ordered and images interpreted today personally by me of previous films they demonstrate some acromioclavicular arthrosis.        Assessment: Tendinosis of rotator cuff  -     Place in Outpatient; Standing  -     Vital signs; Standing  -     Insert peripheral IV; Standing  -     Clip and Prep Other (please specifiy) (Operative site); Standing  -     Cleanse with Chlorhexidine (CHG); Standing  -     Diet NPO; Standing  -     POCT glucose; Standing  -     CBC auto differential; Future  -     Comprehensive metabolic panel; Future  -     X-Ray Chest PA And Lateral; Future; Expected date: 11/17/2022  -     EKG 12-lead; Future  -     Inpatient consult to Anesthesiology; Standing  -     Case Request Operating Room: ARTHROSCOPY,SHOULDER,WITH BICEPS TENODESIS, ARTHROSCOPY, SHOULDER, WITH DISTAL CLAVICLE EXCISION, DEBRIDEMENT, SHOULDER, ARTHROSCOPIC    Left bicipital tenosynovitis  -     Place in Outpatient; Standing  -     Vital signs; Standing  -     Insert peripheral IV; Standing  -     Clip and Prep Other (please specifiy) (Operative site); Standing  -     Cleanse with Chlorhexidine (CHG); Standing  -     Diet NPO; Standing  -     POCT glucose; Standing  -     CBC auto differential; Future  -     Comprehensive metabolic panel; Future  -     X-Ray Chest PA And Lateral; Future; Expected date: 11/17/2022  -     EKG 12-lead; Future  -     Inpatient consult to Anesthesiology; Standing  -     Case Request Operating Room: ARTHROSCOPY,SHOULDER,WITH BICEPS TENODESIS, ARTHROSCOPY, SHOULDER, WITH DISTAL CLAVICLE EXCISION, DEBRIDEMENT, SHOULDER, ARTHROSCOPIC    Arthrosis of left acromioclavicular joint  -     Place in Outpatient; Standing  -     Vital signs; Standing  -     Insert peripheral IV; Standing  -     Clip and Prep Other (please specifiy) (Operative site); Standing  -      Cleanse with Chlorhexidine (CHG); Standing  -     Diet NPO; Standing  -     POCT glucose; Standing  -     CBC auto differential; Future  -     Comprehensive metabolic panel; Future  -     X-Ray Chest PA And Lateral; Future; Expected date: 11/17/2022  -     EKG 12-lead; Future  -     Inpatient consult to Anesthesiology; Standing  -     Case Request Operating Room: ARTHROSCOPY,SHOULDER,WITH BICEPS TENODESIS, ARTHROSCOPY, SHOULDER, WITH DISTAL CLAVICLE EXCISION, DEBRIDEMENT, SHOULDER, ARTHROSCOPIC    Other orders  -     IP VTE LOW RISK PATIENT; Standing  -     ceFAZolin (ANCEF) 2,732.5 mg in dextrose 5 % 50 mL IVPB  -     acetaminophen tablet 1,000 mg  -     ketorolac tablet 10 mg  -     gabapentin capsule 600 mg  -     gabapentin capsule 300 mg  -     scopolamine 1.3-1.5 mg (1 mg over 3 days) 1 patch  -     aspirin chewable tablet 162 mg        Plan:    After much discussion today, we have decided to proceed with surgical intervention.  The goal be to do a left shoulder arthroscopy procedure with rotator cuff debridement and PRP injection into the rotator cuff with a subacromial decompression, distal clavicle resection, and possible biceps tenodesis. A surgical video explaining the surgery and the risk was reviewed by the patient.  The surgical procedure was explained to the patient in detail.  The patient acknowledges that they understood the risks, benefits, and other alternatives.  Patient signed an informed consent.  This patient will need a postoperative abduction pillow sling to unload the rotator cuff, labrum, and biceps tendon repair so that patient can heal properly.            DISCLAIMER: This note may have been dictated using voice recognition software and may contain grammatical errors.     NOTE: Consult report sent to referring provider via [employer].

## 2022-11-17 NOTE — PROGRESS NOTES
History of present illness:    This is a 61 y.o. year old female This is a patient that present today with shoulder pain.  Patient has had this shoulder pain for several months. This shoulder pain is moderate to severe. Patient reports increased pain with overhead movement. Patient reports night pain. Patient reports anterolateral shoulder pain that radiates down the arm. Patient has been treated previously with medication, bracing, injections, therapy.  11/17/2022 this patient comes in to review her left shoulder MRI demonstrates severe rotator cuff tendinosis and thinning with some severe acromioclavicular arthrosis and impingement.  She also complains of anterior shoulder pain.    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    traMADoL (ULTRAM) 50 mg tablet Take 1 tablet (50 mg total) by mouth every 6 (six) hours as needed for Pain.     Current Facility-Administered Medications   Medication    aspirin chewable tablet 162 mg       Review of patient's allergies indicates:   Allergen Reactions    Azithromycin      Other reaction(s): VOMITING    Metformin Other (See Comments)     Headache       Family History   Problem Relation Age of Onset    Thyroid disease Mother     Diabetes Mother     Atrial fibrillation Mother     Depression Mother     Osteoarthritis Mother     Kidney disease Mother     Mental illness Mother     Vision loss Mother         Macular  "degeneration    Osteoarthritis Father     Hypertension Father     Asthma Father     Coronary artery disease Father     Arthritis Father     Heart disease Father     Depression Sister     Asthma Sister     Mental illness Sister        Social History     Socioeconomic History    Marital status:      Spouse name: Adeel    Number of children: 0   Occupational History    Occupation: [employer]   Tobacco Use    Smoking status: Former     Packs/day: 0.50     Years: 38.00     Pack years: 19.00     Types: Cigarettes     Start date: 1983     Quit date: 2021     Years since quittin.9    Smokeless tobacco: Never   Substance and Sexual Activity    Alcohol use: Yes     Comment: 3 times per year    Drug use: Never    Sexual activity: Not Currently     Partners: Male     Birth control/protection: Post-menopausal       Chief Complaint:   Chief Complaint   Patient presents with    Left Shoulder - Pain    Left Hip - Pain    Results     MRI results Left shoulder    Hip Pain     would like to dicuss Left hip problems from an injury a few years ago and its started to shooting down into her knee would like to discuss that, had an XR/injection in her hip on tuesday 11/15/22       Consulting Physician: No ref. provider found      Review of Systems:    All review of systems negative except for those stated in the HPI    Examination:    Vital Signs:    Vitals:    22 0812   Weight: 109.3 kg (241 lb)   Height: 5' 10" (1.778 m)       Body mass index is 34.58 kg/m².    Physical Exam:   General: Well-developed, well-nourished.  Neuro: Alert and oriented x 3.  Psych: Normal mood and affect.  Shoulder Exam:  No obvious deformity. No medial or lateral scapula winging. Forward flexion to 170 degrees and abduction to 170 degrees. Positive empty can,  positive Whipple, Positive drop arm test, Ballard, impingement, Positive AC joint tenderness.  Positive biceps groove tenderness, Positive Dejesus´s, Yergason´s, Speed " test. Negative Apprehension and Relocation test. 4/5 strength, normal skin appearance and palpable pulses distally. Sensibility normal.      Imaging: X-rays ordered and images interpreted today personally by me of previous films they demonstrate some acromioclavicular arthrosis.        Assessment: Tendinosis of rotator cuff  -     Place in Outpatient; Standing  -     Vital signs; Standing  -     Insert peripheral IV; Standing  -     Clip and Prep Other (please specifiy) (Operative site); Standing  -     Cleanse with Chlorhexidine (CHG); Standing  -     Diet NPO; Standing  -     POCT glucose; Standing  -     CBC auto differential; Future  -     Comprehensive metabolic panel; Future  -     X-Ray Chest PA And Lateral; Future; Expected date: 11/17/2022  -     EKG 12-lead; Future  -     Inpatient consult to Anesthesiology; Standing  -     Case Request Operating Room: ARTHROSCOPY,SHOULDER,WITH BICEPS TENODESIS, ARTHROSCOPY, SHOULDER, WITH DISTAL CLAVICLE EXCISION, DEBRIDEMENT, SHOULDER, ARTHROSCOPIC    Left bicipital tenosynovitis  -     Place in Outpatient; Standing  -     Vital signs; Standing  -     Insert peripheral IV; Standing  -     Clip and Prep Other (please specifiy) (Operative site); Standing  -     Cleanse with Chlorhexidine (CHG); Standing  -     Diet NPO; Standing  -     POCT glucose; Standing  -     CBC auto differential; Future  -     Comprehensive metabolic panel; Future  -     X-Ray Chest PA And Lateral; Future; Expected date: 11/17/2022  -     EKG 12-lead; Future  -     Inpatient consult to Anesthesiology; Standing  -     Case Request Operating Room: ARTHROSCOPY,SHOULDER,WITH BICEPS TENODESIS, ARTHROSCOPY, SHOULDER, WITH DISTAL CLAVICLE EXCISION, DEBRIDEMENT, SHOULDER, ARTHROSCOPIC    Arthrosis of left acromioclavicular joint  -     Place in Outpatient; Standing  -     Vital signs; Standing  -     Insert peripheral IV; Standing  -     Clip and Prep Other (please specifiy) (Operative site); Standing  -      Cleanse with Chlorhexidine (CHG); Standing  -     Diet NPO; Standing  -     POCT glucose; Standing  -     CBC auto differential; Future  -     Comprehensive metabolic panel; Future  -     X-Ray Chest PA And Lateral; Future; Expected date: 11/17/2022  -     EKG 12-lead; Future  -     Inpatient consult to Anesthesiology; Standing  -     Case Request Operating Room: ARTHROSCOPY,SHOULDER,WITH BICEPS TENODESIS, ARTHROSCOPY, SHOULDER, WITH DISTAL CLAVICLE EXCISION, DEBRIDEMENT, SHOULDER, ARTHROSCOPIC    Other orders  -     IP VTE LOW RISK PATIENT; Standing  -     ceFAZolin (ANCEF) 2,732.5 mg in dextrose 5 % 50 mL IVPB  -     acetaminophen tablet 1,000 mg  -     ketorolac tablet 10 mg  -     gabapentin capsule 600 mg  -     gabapentin capsule 300 mg  -     scopolamine 1.3-1.5 mg (1 mg over 3 days) 1 patch  -     aspirin chewable tablet 162 mg        Plan:    After much discussion today, we have decided to proceed with surgical intervention.  The goal be to do a left shoulder arthroscopy procedure with rotator cuff debridement and PRP injection into the rotator cuff with a subacromial decompression, distal clavicle resection, and possible biceps tenodesis. A surgical video explaining the surgery and the risk was reviewed by the patient.  The surgical procedure was explained to the patient in detail.  The patient acknowledges that they understood the risks, benefits, and other alternatives.  Patient signed an informed consent.  This patient will need a postoperative abduction pillow sling to unload the rotator cuff, labrum, and biceps tendon repair so that patient can heal properly.            DISCLAIMER: This note may have been dictated using voice recognition software and may contain grammatical errors.     NOTE: Consult report sent to referring provider via [employer].

## 2022-11-27 ENCOUNTER — PATIENT MESSAGE (OUTPATIENT)
Dept: ADMINISTRATIVE | Facility: OTHER | Age: 62
End: 2022-11-27
Payer: COMMERCIAL

## 2022-11-28 ENCOUNTER — PATIENT MESSAGE (OUTPATIENT)
Dept: ADMINISTRATIVE | Facility: OTHER | Age: 62
End: 2022-11-28
Payer: COMMERCIAL

## 2022-11-29 ENCOUNTER — PATIENT MESSAGE (OUTPATIENT)
Dept: ADMINISTRATIVE | Facility: OTHER | Age: 62
End: 2022-11-29
Payer: COMMERCIAL

## 2022-11-30 ENCOUNTER — HOSPITAL ENCOUNTER (OUTPATIENT)
Facility: HOSPITAL | Age: 62
Discharge: HOME | End: 2022-11-30
Attending: ORTHOPAEDIC SURGERY | Admitting: ORTHOPAEDIC SURGERY
Payer: COMMERCIAL

## 2022-11-30 ENCOUNTER — ANESTHESIA (OUTPATIENT)
Dept: SURGERY | Facility: HOSPITAL | Age: 62
End: 2022-11-30
Payer: COMMERCIAL

## 2022-11-30 DIAGNOSIS — Z01.818 PREOP TESTING: ICD-10-CM

## 2022-11-30 DIAGNOSIS — M19.012 ARTHROSIS OF LEFT ACROMIOCLAVICULAR JOINT: ICD-10-CM

## 2022-11-30 DIAGNOSIS — M67.819 TENDINOSIS OF ROTATOR CUFF: Primary | ICD-10-CM

## 2022-11-30 DIAGNOSIS — M75.22 LEFT BICIPITAL TENOSYNOVITIS: ICD-10-CM

## 2022-11-30 LAB — POCT GLUCOSE: 149 MG/DL (ref 70–110)

## 2022-11-30 PROCEDURE — 71000033 HC RECOVERY, INTIAL HOUR: Performed by: ORTHOPAEDIC SURGERY

## 2022-11-30 PROCEDURE — C1713 ANCHOR/SCREW BN/BN,TIS/BN: HCPCS | Performed by: ORTHOPAEDIC SURGERY

## 2022-11-30 PROCEDURE — 76942 ECHO GUIDE FOR BIOPSY: CPT | Performed by: ANESTHESIOLOGY

## 2022-11-30 PROCEDURE — 29824 SHO ARTHRS SRG DSTL CLAVICLC: CPT | Mod: 51,LT,, | Performed by: ORTHOPAEDIC SURGERY

## 2022-11-30 PROCEDURE — 37000009 HC ANESTHESIA EA ADD 15 MINS: Performed by: ORTHOPAEDIC SURGERY

## 2022-11-30 PROCEDURE — 82962 GLUCOSE BLOOD TEST: CPT | Performed by: ORTHOPAEDIC SURGERY

## 2022-11-30 PROCEDURE — 63600175 PHARM REV CODE 636 W HCPCS: Performed by: NURSE ANESTHETIST, CERTIFIED REGISTERED

## 2022-11-30 PROCEDURE — 25000003 PHARM REV CODE 250: Performed by: ORTHOPAEDIC SURGERY

## 2022-11-30 PROCEDURE — 29824 SHO ARTHRS SRG DSTL CLAVICLC: CPT | Mod: AS,51,LT,

## 2022-11-30 PROCEDURE — 29828 SHO ARTHRS SRG BICP TENODSIS: CPT | Mod: AS,LT,,

## 2022-11-30 PROCEDURE — 29828 PR ARTHROSCOPY SHOULDER SURGICAL BICEPS TENODESIS: ICD-10-PCS | Mod: AS,LT,,

## 2022-11-30 PROCEDURE — 71000015 HC POSTOP RECOV 1ST HR: Performed by: ORTHOPAEDIC SURGERY

## 2022-11-30 PROCEDURE — 25000003 PHARM REV CODE 250: Performed by: ANESTHESIOLOGY

## 2022-11-30 PROCEDURE — 29824 PR SHLDR ARTHROSCOP,SURG,DIS CLAVICULECTOMY: ICD-10-PCS | Mod: AS,51,LT,

## 2022-11-30 PROCEDURE — 29824 PR SHLDR ARTHROSCOP,SURG,DIS CLAVICULECTOMY: ICD-10-PCS | Mod: 51,LT,, | Performed by: ORTHOPAEDIC SURGERY

## 2022-11-30 PROCEDURE — 25000003 PHARM REV CODE 250: Performed by: NURSE ANESTHETIST, CERTIFIED REGISTERED

## 2022-11-30 PROCEDURE — 37000008 HC ANESTHESIA 1ST 15 MINUTES: Performed by: ORTHOPAEDIC SURGERY

## 2022-11-30 PROCEDURE — 71000016 HC POSTOP RECOV ADDL HR: Performed by: ORTHOPAEDIC SURGERY

## 2022-11-30 PROCEDURE — 36000711: Performed by: ORTHOPAEDIC SURGERY

## 2022-11-30 PROCEDURE — 63600175 PHARM REV CODE 636 W HCPCS: Performed by: ANESTHESIOLOGY

## 2022-11-30 PROCEDURE — 29828 SHO ARTHRS SRG BICP TENODSIS: CPT | Mod: LT,,, | Performed by: ORTHOPAEDIC SURGERY

## 2022-11-30 PROCEDURE — 36000710: Performed by: ORTHOPAEDIC SURGERY

## 2022-11-30 PROCEDURE — 63600175 PHARM REV CODE 636 W HCPCS

## 2022-11-30 PROCEDURE — 29828 PR ARTHROSCOPY SHOULDER SURGICAL BICEPS TENODESIS: ICD-10-PCS | Mod: LT,,, | Performed by: ORTHOPAEDIC SURGERY

## 2022-11-30 PROCEDURE — 27201423 OPTIME MED/SURG SUP & DEVICES STERILE SUPPLY: Performed by: ORTHOPAEDIC SURGERY

## 2022-11-30 DEVICE — LNT IMPLANT SYSTEM, 3.9 BC SWIVELOCK
Type: IMPLANTABLE DEVICE | Site: SHOULDER | Status: FUNCTIONAL
Brand: ARTHREX®

## 2022-11-30 RX ORDER — PROPOFOL 10 MG/ML
VIAL (ML) INTRAVENOUS
Status: DISCONTINUED | OUTPATIENT
Start: 2022-11-30 | End: 2022-11-30

## 2022-11-30 RX ORDER — ACETAMINOPHEN 500 MG
1000 TABLET ORAL
Status: COMPLETED | OUTPATIENT
Start: 2022-11-30 | End: 2022-11-30

## 2022-11-30 RX ORDER — ONDANSETRON 4 MG/1
4 TABLET, ORALLY DISINTEGRATING ORAL ONCE
Status: DISCONTINUED | OUTPATIENT
Start: 2022-11-30 | End: 2022-11-30 | Stop reason: HOSPADM

## 2022-11-30 RX ORDER — PHENYLEPHRINE HYDROCHLORIDE 10 MG/ML
INJECTION INTRAVENOUS
Status: DISCONTINUED | OUTPATIENT
Start: 2022-11-30 | End: 2022-11-30

## 2022-11-30 RX ORDER — FENTANYL CITRATE 50 UG/ML
INJECTION, SOLUTION INTRAMUSCULAR; INTRAVENOUS
Status: DISCONTINUED | OUTPATIENT
Start: 2022-11-30 | End: 2022-11-30

## 2022-11-30 RX ORDER — LIDOCAINE HYDROCHLORIDE 10 MG/ML
1 INJECTION, SOLUTION EPIDURAL; INFILTRATION; INTRACAUDAL; PERINEURAL ONCE
Status: DISCONTINUED | OUTPATIENT
Start: 2022-11-30 | End: 2022-11-30 | Stop reason: HOSPADM

## 2022-11-30 RX ORDER — GABAPENTIN 300 MG/1
600 CAPSULE ORAL
Status: COMPLETED | OUTPATIENT
Start: 2022-11-30 | End: 2022-11-30

## 2022-11-30 RX ORDER — OXYCODONE HYDROCHLORIDE 5 MG/1
5 TABLET ORAL EVERY 12 HOURS PRN
Qty: 14 TABLET | Refills: 0 | Status: SHIPPED | OUTPATIENT
Start: 2022-11-30 | End: 2022-12-07

## 2022-11-30 RX ORDER — MELOXICAM 7.5 MG/1
TABLET ORAL
Qty: 30 TABLET | Refills: 0 | Status: SHIPPED | OUTPATIENT
Start: 2022-12-06 | End: 2022-12-15

## 2022-11-30 RX ORDER — CEFAZOLIN SODIUM 2 G/100ML
2 INJECTION, SOLUTION INTRAVENOUS
Status: DISCONTINUED | OUTPATIENT
Start: 2022-11-30 | End: 2022-11-30 | Stop reason: HOSPADM

## 2022-11-30 RX ORDER — ROPIVACAINE HYDROCHLORIDE 5 MG/ML
INJECTION, SOLUTION EPIDURAL; INFILTRATION; PERINEURAL
Status: COMPLETED | OUTPATIENT
Start: 2022-11-30 | End: 2022-11-30

## 2022-11-30 RX ORDER — ROPIVACAINE HYDROCHLORIDE 5 MG/ML
INJECTION, SOLUTION EPIDURAL; INFILTRATION; PERINEURAL
Status: COMPLETED
Start: 2022-11-30 | End: 2022-11-30

## 2022-11-30 RX ORDER — ONDANSETRON 2 MG/ML
4 INJECTION INTRAMUSCULAR; INTRAVENOUS DAILY PRN
Status: DISCONTINUED | OUTPATIENT
Start: 2022-11-30 | End: 2022-11-30 | Stop reason: HOSPADM

## 2022-11-30 RX ORDER — METOCLOPRAMIDE HYDROCHLORIDE 5 MG/ML
10 INJECTION INTRAMUSCULAR; INTRAVENOUS EVERY 10 MIN PRN
Status: DISCONTINUED | OUTPATIENT
Start: 2022-11-30 | End: 2022-11-30 | Stop reason: HOSPADM

## 2022-11-30 RX ORDER — MORPHINE SULFATE 4 MG/ML
3 INJECTION, SOLUTION INTRAMUSCULAR; INTRAVENOUS
Status: DISCONTINUED | OUTPATIENT
Start: 2022-11-30 | End: 2022-11-30 | Stop reason: HOSPADM

## 2022-11-30 RX ORDER — KETOROLAC TROMETHAMINE 10 MG/1
10 TABLET, FILM COATED ORAL EVERY 6 HOURS
Qty: 20 TABLET | Refills: 0 | Status: SHIPPED | OUTPATIENT
Start: 2022-11-30 | End: 2022-12-05

## 2022-11-30 RX ORDER — SODIUM CHLORIDE, SODIUM GLUCONATE, SODIUM ACETATE, POTASSIUM CHLORIDE AND MAGNESIUM CHLORIDE 30; 37; 368; 526; 502 MG/100ML; MG/100ML; MG/100ML; MG/100ML; MG/100ML
INJECTION, SOLUTION INTRAVENOUS CONTINUOUS
Status: DISCONTINUED | OUTPATIENT
Start: 2022-11-30 | End: 2022-11-30 | Stop reason: HOSPADM

## 2022-11-30 RX ORDER — ONDANSETRON 2 MG/ML
INJECTION INTRAMUSCULAR; INTRAVENOUS
Status: DISCONTINUED | OUTPATIENT
Start: 2022-11-30 | End: 2022-11-30

## 2022-11-30 RX ORDER — ASPIRIN 81 MG/1
81 TABLET ORAL 2 TIMES DAILY
Qty: 60 TABLET | Refills: 0 | Status: SHIPPED | OUTPATIENT
Start: 2022-11-30 | End: 2022-12-28

## 2022-11-30 RX ORDER — METHOCARBAMOL 750 MG/1
750 TABLET, FILM COATED ORAL EVERY 6 HOURS
Qty: 56 TABLET | Refills: 0 | Status: SHIPPED | OUTPATIENT
Start: 2022-11-30 | End: 2022-12-14

## 2022-11-30 RX ORDER — ROCURONIUM BROMIDE 10 MG/ML
INJECTION, SOLUTION INTRAVENOUS
Status: DISCONTINUED | OUTPATIENT
Start: 2022-11-30 | End: 2022-11-30

## 2022-11-30 RX ORDER — METHOCARBAMOL 500 MG/1
1000 TABLET, FILM COATED ORAL EVERY 6 HOURS PRN
Status: DISCONTINUED | OUTPATIENT
Start: 2022-11-30 | End: 2022-11-30 | Stop reason: HOSPADM

## 2022-11-30 RX ORDER — LIDOCAINE HYDROCHLORIDE 10 MG/ML
INJECTION, SOLUTION EPIDURAL; INFILTRATION; INTRACAUDAL; PERINEURAL
Status: DISCONTINUED | OUTPATIENT
Start: 2022-11-30 | End: 2022-11-30

## 2022-11-30 RX ORDER — MAG HYDROX/ALUMINUM HYD/SIMETH 200-200-20
30 SUSPENSION, ORAL (FINAL DOSE FORM) ORAL EVERY 6 HOURS PRN
Status: DISCONTINUED | OUTPATIENT
Start: 2022-11-30 | End: 2022-11-30 | Stop reason: HOSPADM

## 2022-11-30 RX ORDER — CEFAZOLIN SODIUM 1 G/3ML
INJECTION, POWDER, FOR SOLUTION INTRAMUSCULAR; INTRAVENOUS
Status: DISCONTINUED | OUTPATIENT
Start: 2022-11-30 | End: 2022-11-30

## 2022-11-30 RX ORDER — HYDROMORPHONE HYDROCHLORIDE 2 MG/ML
0.2 INJECTION, SOLUTION INTRAMUSCULAR; INTRAVENOUS; SUBCUTANEOUS EVERY 5 MIN PRN
Status: DISCONTINUED | OUTPATIENT
Start: 2022-11-30 | End: 2022-11-30 | Stop reason: HOSPADM

## 2022-11-30 RX ORDER — MIDAZOLAM HYDROCHLORIDE 1 MG/ML
INJECTION INTRAMUSCULAR; INTRAVENOUS
Status: DISCONTINUED
Start: 2022-11-30 | End: 2022-11-30 | Stop reason: HOSPADM

## 2022-11-30 RX ORDER — SODIUM CHLORIDE 9 MG/ML
INJECTION, SOLUTION INTRAVENOUS CONTINUOUS
Status: DISCONTINUED | OUTPATIENT
Start: 2022-11-30 | End: 2022-11-30 | Stop reason: HOSPADM

## 2022-11-30 RX ORDER — ACETAMINOPHEN 500 MG
1000 TABLET ORAL EVERY 8 HOURS
Qty: 50 TABLET | Refills: 0 | Status: SHIPPED | OUTPATIENT
Start: 2022-11-30 | End: 2022-12-28

## 2022-11-30 RX ORDER — HYDROCODONE BITARTRATE AND ACETAMINOPHEN 5; 325 MG/1; MG/1
1 TABLET ORAL EVERY 4 HOURS PRN
Status: DISCONTINUED | OUTPATIENT
Start: 2022-11-30 | End: 2022-11-30 | Stop reason: HOSPADM

## 2022-11-30 RX ORDER — METOCLOPRAMIDE HYDROCHLORIDE 5 MG/ML
10 INJECTION INTRAMUSCULAR; INTRAVENOUS EVERY 6 HOURS PRN
Status: DISCONTINUED | OUTPATIENT
Start: 2022-11-30 | End: 2022-11-30 | Stop reason: HOSPADM

## 2022-11-30 RX ORDER — KETOROLAC TROMETHAMINE 10 MG/1
10 TABLET, FILM COATED ORAL
Status: COMPLETED | OUTPATIENT
Start: 2022-11-30 | End: 2022-11-30

## 2022-11-30 RX ORDER — GABAPENTIN 300 MG/1
300 CAPSULE ORAL
Status: DISCONTINUED | OUTPATIENT
Start: 2022-11-30 | End: 2022-11-30 | Stop reason: HOSPADM

## 2022-11-30 RX ORDER — ONDANSETRON 4 MG/1
4 TABLET, ORALLY DISINTEGRATING ORAL EVERY 6 HOURS PRN
Qty: 30 TABLET | Refills: 0 | Status: SHIPPED | OUTPATIENT
Start: 2022-11-30 | End: 2022-12-10

## 2022-11-30 RX ORDER — SCOLOPAMINE TRANSDERMAL SYSTEM 1 MG/1
1 PATCH, EXTENDED RELEASE TRANSDERMAL ONCE AS NEEDED
Status: DISCONTINUED | OUTPATIENT
Start: 2022-11-30 | End: 2022-11-30 | Stop reason: HOSPADM

## 2022-11-30 RX ORDER — ACETAMINOPHEN 500 MG
1000 TABLET ORAL
Status: DISCONTINUED | OUTPATIENT
Start: 2022-11-30 | End: 2022-11-30 | Stop reason: HOSPADM

## 2022-11-30 RX ORDER — DIPHENHYDRAMINE HYDROCHLORIDE 50 MG/ML
25 INJECTION INTRAMUSCULAR; INTRAVENOUS EVERY 6 HOURS PRN
Status: DISCONTINUED | OUTPATIENT
Start: 2022-11-30 | End: 2022-11-30 | Stop reason: HOSPADM

## 2022-11-30 RX ORDER — ONDANSETRON 2 MG/ML
4 INJECTION INTRAMUSCULAR; INTRAVENOUS EVERY 6 HOURS PRN
Status: DISCONTINUED | OUTPATIENT
Start: 2022-11-30 | End: 2022-11-30 | Stop reason: HOSPADM

## 2022-11-30 RX ORDER — MIDAZOLAM HYDROCHLORIDE 1 MG/ML
INJECTION INTRAMUSCULAR; INTRAVENOUS
Status: COMPLETED
Start: 2022-11-30 | End: 2022-11-30

## 2022-11-30 RX ORDER — GABAPENTIN 300 MG/1
300 CAPSULE ORAL EVERY 8 HOURS
Qty: 15 CAPSULE | Refills: 0 | Status: SHIPPED | OUTPATIENT
Start: 2022-11-30 | End: 2022-12-28

## 2022-11-30 RX ORDER — MIDAZOLAM HYDROCHLORIDE 1 MG/ML
2 INJECTION INTRAMUSCULAR; INTRAVENOUS ONCE AS NEEDED
Status: COMPLETED | OUTPATIENT
Start: 2022-11-30 | End: 2022-11-30

## 2022-11-30 RX ADMIN — SUGAMMADEX 200 MG: 100 INJECTION, SOLUTION INTRAVENOUS at 12:11

## 2022-11-30 RX ADMIN — KETOROLAC TROMETHAMINE 10 MG: 10 TABLET, FILM COATED ORAL at 09:11

## 2022-11-30 RX ADMIN — LIDOCAINE HYDROCHLORIDE 5 ML: 10 INJECTION, SOLUTION EPIDURAL; INFILTRATION; INTRACAUDAL; PERINEURAL at 10:11

## 2022-11-30 RX ADMIN — ROPIVACAINE HYDROCHLORIDE 30 ML: 5 INJECTION, SOLUTION EPIDURAL; INFILTRATION; PERINEURAL at 10:11

## 2022-11-30 RX ADMIN — PHENYLEPHRINE HYDROCHLORIDE 100 MCG: 10 INJECTION INTRAVENOUS at 11:11

## 2022-11-30 RX ADMIN — SODIUM CHLORIDE, SODIUM GLUCONATE, SODIUM ACETATE, POTASSIUM CHLORIDE AND MAGNESIUM CHLORIDE: 526; 502; 368; 37; 30 INJECTION, SOLUTION INTRAVENOUS at 09:11

## 2022-11-30 RX ADMIN — ROCURONIUM BROMIDE 50 MG: 10 SOLUTION INTRAVENOUS at 10:11

## 2022-11-30 RX ADMIN — CEFAZOLIN 2 G: 330 INJECTION, POWDER, FOR SOLUTION INTRAMUSCULAR; INTRAVENOUS at 10:11

## 2022-11-30 RX ADMIN — GABAPENTIN 600 MG: 300 CAPSULE ORAL at 09:11

## 2022-11-30 RX ADMIN — ONDANSETRON HYDROCHLORIDE 4 MG: 2 SOLUTION INTRAMUSCULAR; INTRAVENOUS at 12:11

## 2022-11-30 RX ADMIN — SODIUM CHLORIDE, SODIUM GLUCONATE, SODIUM ACETATE, POTASSIUM CHLORIDE AND MAGNESIUM CHLORIDE: 526; 502; 368; 37; 30 INJECTION, SOLUTION INTRAVENOUS at 10:11

## 2022-11-30 RX ADMIN — MIDAZOLAM 2 MG: 1 INJECTION INTRAMUSCULAR; INTRAVENOUS at 10:11

## 2022-11-30 RX ADMIN — MIDAZOLAM HYDROCHLORIDE 2 MG: 1 INJECTION INTRAMUSCULAR; INTRAVENOUS at 10:11

## 2022-11-30 RX ADMIN — SCOPOLAMINE 1 PATCH: 1 SYSTEM TRANSDERMAL at 09:11

## 2022-11-30 RX ADMIN — PROPOFOL 150 MG: 10 INJECTION, EMULSION INTRAVENOUS at 10:11

## 2022-11-30 RX ADMIN — SODIUM CHLORIDE, SODIUM GLUCONATE, SODIUM ACETATE, POTASSIUM CHLORIDE AND MAGNESIUM CHLORIDE: 526; 502; 368; 37; 30 INJECTION, SOLUTION INTRAVENOUS at 11:11

## 2022-11-30 RX ADMIN — PHENYLEPHRINE HYDROCHLORIDE 30 MCG/MIN: 10 INJECTION INTRAVENOUS at 11:11

## 2022-11-30 RX ADMIN — ACETAMINOPHEN 1000 MG: 500 TABLET ORAL at 09:11

## 2022-11-30 RX ADMIN — FENTANYL CITRATE 100 MCG: 50 INJECTION, SOLUTION INTRAMUSCULAR; INTRAVENOUS at 10:11

## 2022-11-30 NOTE — TRANSFER OF CARE
"Anesthesia Transfer of Care Note    Patient: Melvina Pate    Procedure(s) Performed: Procedure(s) (LRB):  ARTHROSCOPY,SHOULDER,WITH BICEPS TENODESIS (Left)  ARTHROSCOPY, SHOULDER, WITH DISTAL CLAVICLE EXCISION (Left)  DEBRIDEMENT, SHOULDER, ARTHROSCOPIC (Left)    Patient location: PACU    Anesthesia Type: general    Transport from OR: Transported from OR on room air with adequate spontaneous ventilation    Post pain: adequate analgesia    Post assessment: no apparent anesthetic complications    Post vital signs: stable    Level of consciousness: sedated    Nausea/Vomiting: no nausea/vomiting    Complications: none    Transfer of care protocol was followed      Last vitals:   Visit Vitals  /75   Pulse 67   Temp 36 °C (96.8 °F) (Tympanic)   Resp 20   Ht 5' 10" (1.778 m)   Wt 112 kg (246 lb 14.6 oz)   SpO2 96%   Breastfeeding No   BMI 35.43 kg/m²     "

## 2022-11-30 NOTE — ANESTHESIA PROCEDURE NOTES
Intubation    Date/Time: 11/30/2022 10:44 AM  Performed by: Saulo Flores CRNA  Authorized by: Handy Sandoval MD     Intubation:     Induction:  Intravenous    Intubated:  Postinduction    Mask Ventilation:  Easy with oral airway    Attempts:  1    Attempted By:  CRNA    Method of Intubation:  Direct    Blade:  Foster 3    Laryngeal View Grade: Grade IIA - cords partially seen      Difficult Airway Encountered?: No      Complications:  None    Airway Device:  Oral endotracheal tube    Airway Device Size:  7.5    Style/Cuff Inflation:  Cuffed (inflated to minimal occlusive pressure)    Inflation Amount (mL):  6    Tube secured:  21    Secured at:  The lips    Placement Verified By:  Capnometry    Complicating Factors:  None    Findings Post-Intubation:  BS equal bilateral

## 2022-11-30 NOTE — OP NOTE
11/30/2022    PRIMARY SURGEON: Itz Curtis MD    ASSISTANT: Iman Cleveland NP was imperative to the critical parts of the surgical case.  This included the surgical approach and dissection, retraction, placement of hardware and implants, and closure.    PREOPERATIVE DIAGNOSIS:  1.  Left rotator cuff tendinosis  2.  Acromioclavicular arthrosis    POSTOPERATIVE DIAGNOSIS:  1.  Left rotator cuff tendinosis with partial tearing less than 50%  2.  Acromioclavicular arthrosis  3.  Intra-articular biceps tendon tear and medial subluxation    PROCEDURES:  1.  Left diagnostic shoulder arthroscopy  2.  Arthroscopic biceps tenodesis  3.  Distal clavicle resection  4.  Arthroscopic rotator cuff debridement with PRP (platelet rich plasma) injection    ANESTHESIA:  General anesthesia with supraclavicular nerve block    BLOOD LOSS:  Less than 10 cc    DVT PROPHYLAXIS:  Aspirin for 4-6 weeks    OUTCOME:  Patient tolerated treatment/procedure well without complications and is now ready for discharge.    DISPOSITION: Home/SelfCare    FOLLOW UP: In clinic    INSTRUMENTATION:  Biceps tenodesis: Arthrex 3.9 mm BioComposite SwiveLock anchor   Implant Name Type Inv. Item Serial No.  Lot No. LRB No. Used Action   SYS IMP SWVLOK 3.9 BC - [id number]  SYS IMP SWVLOK 3.9 BC  [employer] [id number] Left 1 Implanted       PROCEDURE IN DETAIL:    Diagnostic arthroscopy of shoulder    The examination under anesthesia was performed for skin defects and other contraindications and none were found.The patient was carefully placed in a lateral decubitus position. All bony prominences were padded and sterile U-drape was applied. Patients operative extremity was placed in suspension device with 7-10lbs of weight applied. The skin was prepped in normal sterile fashion. A time out was performed to confirm correct operative extremity, allergies, and antibiotic infusion. All portals were made with an 11-blade and an 18-gauge spinal needle was used  to help probe and find proper alignment for the portals. After creating posterior portal, an arthroscope was inserted into the glenohumeral joint. A diagnostic arthroscopy was then performed in this sequential order: biceps anchor, rotator interval, subscapularis tendon, superior glenohumeral ligament, middle glenohumeral ligament, inferior glenohumeral ligament, anterior and inferior capsular attachments, anterior, inferior, and posterior labrum, teres minor tendon, infraspinatus tendon, and supraspinatus tendon, and biceps tendon in the rotator interval.    The certified assistant provided critical assistance by holding instruments including the arthroscope to provide adequate visualization of the procedure, as well as assisting in wound closure.    At the end of the procedure the portals were closed with mastisol around the wound and placement of steri-strips. The patient tolerated the procedure well and taken to the recovery room in good condition.      Arthroscopic Biceps Tenodesis    Viewing through posterior portal, I noticed that there was fraying at the base of the biceps attachment as well as biceps tendon medialization/subluxation. A loop suture was passed around the midportion of the intra-articular portion of the biceps tendon, working through the anterior portal.  I then pierced through the tendon and grabbed the distal end of the loop suture and pulled it through the tendon to snag the bicep tendon. I then cut the biceps tendon at the base as it attaches to the superior labrum.  And then placed the anterior portal just superior to the subscapularis tendon as it attaches to the humeral head.  I then used a tap.  I then placed the suture through the anchor and then placed the anchor into the tapped hole. I then probed the tenodesis to make sure it was secure.    Distal clavicle resection    A distal clavicle resection was then carried out. Viewing from the posterior portal, the shaver was used to  debride the acromioclavicular joint and associated soft tissues through the anterior portal. Once cleared of the soft tissue, the zulema was used to resect the distal end of the clavicle concentrating initially upon the inferior and distal clavicular spurs. The resection was then carried superiorly to remove the upper end of the clavicle to remove approximately 5-7mm of spurs. Then the zulema was used on the acromion to remove 2-3mm of bone. The resection was then measured with a probe to confirm the 8-10mm of bone was resected.     Rotator cuff debridement with PRP    Partial tearing and tendinosis was noted of the rotator cuff tendon. The partial tear was less than 50% of the tendon thickness (< 6mm). A motorized shaver was introduced through the anterior portal and the area of partial tearing was debrided to a stable base.     I then placed an 18-gauge spinal needle into the supraspinatus and infraspinatus tendons.  I removed all of the inflow and outflow tubings and injected platelet rich plasma into that site.

## 2022-11-30 NOTE — PATIENT INSTRUCTIONS
Discharge Instructions  Itz Curtis MD  [address], [address]  [place] [address]  [phone]  Instructions for After Surgery:  Follow up:  -Postoperative follow up evaluation should be scheduled for 12/02/2022 at [facility] across from our office.  -Physical therapy will be set up at your first postoperative appointment.    Prescriptions:  -All prescriptions have been printed and will be made available to you at discharge.  -See multi-modal pain protocol instructions.  -While taking anti-inflammatories (ketorolac (Toradol) and meloxicam (Mobic)), avoid all other over-the-counter anti-inflammatories (ex. Advil, Aleve, ibuprofen, Motrin, naproxen, etc.)  -While taking anti-inflammatories, you may want to take a medication to protect your stomach (ex. omeprazole, Prilosec, Prevacid, Pepcid, Nexium, etc.)  -Take anti-inflammatories with food and discontinue if GI upset occurs.  -The narcotic prescription Oxycodone and gabapentin (Neurontin) may cause sedation, do NOT drive while taking these medications.    Diet:  -The first meal at home should be a liquid meal (ex. Soup and/or juices) to prevent postoperative nausea.   -Advance to normal diet as tolerated if no postoperative nausea.  -Take prescribed ondansetron (Zofran) as needed for any postoperative nausea and vomiting.    Constipation Prevention:  -Take Miralax or Senakot S/Ayanna-Colace and stool softeners DAILY while taking pain medications.  -Increase water and fiber intake.  -Move around as much as possible.  -Use other, more aggressive, over-the-counter laxatives as needed for constipation (i.e. Milk of Magnesia, Dulcolax tablet/suppository, Magnesium citrate, Fleet's enema, etc.)    Wound Care:  -Do NOT remove dressing until follow up.  -Change dressing daily following initial dressing change.  -Once the incision has no drainage, you no longer need a dressing.  -It is okay to take a shower/lightly run water over the wound AFTER daily dressing  changes have been discontinued.  -Do NOT submerge the wound in water ? it is okay to take a sponge bath, use waterproof bandages, or place a sealed plastic bag over the operative extremity.    Bracing:  -Abduction sling MUST be worn at all times, except when washing your body, changing clothes, or participating in physical therapy.  -Do NOT adjust settings on your brace.  -It is okay to adjust/loosen the tightness of the Abduction sling.    Blood Clot Prevention:  -Take Aspirin 81 mg twice daily for 4 weeks postoperatively.  -Get up and walk around as much as possible.  -Utilize compression hose for 14 days postoperatively to assist with any lower extremity swelling.    Urinary Retention:  If you start having difficulty urinating, decrease Oxycodone and Robaxin and call your primary care doctor or urologist.     Pneumonia Prevention:  Stay out of bed as much as possible, walk around at least every 2 hours and continue breathing exercises as long as you are limited in mobility and on narcotics.     Ice (cryotherapy):  -Cryotherapy (i.e. cold therapy unit or ice bags) has been clinically proven to reduce pain and help control swelling postoperatively.  -Apply cryotherapy for 30 minutes/session with at least a 10-minute break to prevent cold burn injuries.  -Cryotherapy is most beneficial in the first 48-72 hours postoperatively, and can be performed as needed after that.  -Cryotherapy can be used as needed for periodic pain/swelling episodes several weeks postoperatively.    Other Instructions:  -Keep extremity elevated (i.e. above the level of the heart) as much as possible to prevent swelling and reduce pain.  -Non weight bearing to the operative extremity   -If you had a nerve block, take your Oxycodone BEFORE your pain becomes too severe.            [facility]  [address], [place] [address]  Phone [phone]/ Fax [phone]  Multimodal Pain  Management Instructions  Postoperative regimen:          Days 1-5:     Toradol/Ketorolac (anti-inflammatory) - take 10mg every 6 hours, around the clock. (While on Toradol, take a medication to protect your stomach, such as Omeprazole, Prilosec, Prevacid, Pepcid, Nexium....etc).     Robaxin/Methocarbamol (muscle relaxer) 750mg every 6 hours, around the clock for muscle spasms, thigh pain and stiffness, additional pain control. This medication is helpful for pain control while lessening your need for narcotics.    Neurontin/Gabapentin (neuropathic/shocking/ nerve like pain) 300mg every 8 hours - if you get too sleepy during the day, switch to nightly dosing or discontinue the use completely.     Tylenol/Acetaminophen (Pain Pill) 1000mg every 8 hours, around the clock. **NO MORE THAN 3000mg OF TYLENOL IN 24 HOURS**.    *Try to rotate these medications and not take them all at the same time, this will improve your overall pain control*         Days 6-14:    Robaxin/Methocarbamol 750mg every 6 hours  Tylenol 1000mg every 8 hours  Mobic/Meloxicam 7.5 twice a day          Days 15 and beyond:    Tylenol 1000mg three times a day, as needed  Mobic/Meloxicam 7.5mg Daily (optional, AS NEEDED)    *Oxycodone 5 mg (Opioid Narcotic) can be used twice daily for severe breakthrough pain ONLY*  Gradually reduce the use as the pain lessens.

## 2022-11-30 NOTE — ANESTHESIA PROCEDURE NOTES
Peripheral Block    Patient location during procedure: pre-op   Block not for primary anesthetic.  Reason for block: at surgeon's request and post-op pain management   Post-op Pain Location: Left Shoulder, Rotator cuff   Start time: 11/30/2022 10:05 AM  Timeout: 11/30/2022 10:00 AM   End time: 11/30/2022 10:08 AM    Staffing  Authorizing Provider: Handy Sandoval MD  Performing Provider: Handy Sandoval MD    Preanesthetic Checklist  Completed: patient identified, IV checked, site marked, risks and benefits discussed, surgical consent, monitors and equipment checked, pre-op evaluation and timeout performed  Peripheral Block  Patient position: supine  Prep: ChloraPrep  Patient monitoring: heart rate, cardiac monitor, continuous pulse ox, continuous capnometry and frequent blood pressure checks  Block type: supraclavicular  Laterality: left  Injection technique: single shot  Needle  Needle type: Stimuplex   Needle gauge: 22 G  Needle length: 2 in  Needle localization: anatomical landmarks and ultrasound guidance   -ultrasound image captured on disc.  Assessment  Injection assessment: negative aspiration, negative parasthesia and local visualized surrounding nerve  Paresthesia pain: immediately resolved  Heart rate change: no  Slow fractionated injection: yes  Pain Tolerance: comfortable throughout block and no complaints  Medications:    Medications: ropivacaine (NAROPIN) injection 0.5% - Perineural   30 mL - 11/30/2022 10:05:00 AM    Additional Notes  VSS.  [facility] RN monitoring vitals throughout procedure.  Patient tolerated procedure well.

## 2022-12-01 VITALS
WEIGHT: 246.94 LBS | OXYGEN SATURATION: 96 % | SYSTOLIC BLOOD PRESSURE: 123 MMHG | HEART RATE: 68 BPM | BODY MASS INDEX: 35.35 KG/M2 | HEIGHT: 70 IN | RESPIRATION RATE: 20 BRPM | DIASTOLIC BLOOD PRESSURE: 67 MMHG | TEMPERATURE: 97 F

## 2022-12-01 NOTE — ANESTHESIA POSTPROCEDURE EVALUATION
Anesthesia Post Evaluation    Patient: Melvina Pate    Procedure(s) Performed: Procedure(s) (LRB):  ARTHROSCOPY,SHOULDER,WITH BICEPS TENODESIS (Left)  ARTHROSCOPY, SHOULDER, WITH DISTAL CLAVICLE EXCISION (Left)  DEBRIDEMENT, SHOULDER, ARTHROSCOPIC (Left)    Final Anesthesia Type: general      Patient location during evaluation: PACU  Patient participation: Yes- Able to Participate  Level of consciousness: awake and alert and oriented  Post-procedure vital signs: reviewed and stable  Pain management: adequate  Airway patency: patent  IZZY mitigation strategies: Multimodal analgesia  PONV status at discharge: No PONV  Anesthetic complications: no      Cardiovascular status: blood pressure returned to baseline, hemodynamically stable and stable  Respiratory status: unassisted  Hydration status: euvolemic  Follow-up not needed.  Comments: PostOp pain well managed with Nerve Block (Suprclavic., Axillary, Femoral, etc.)          Vitals Value Taken Time   /67 11/30/22 1402   Temp 36.2 °C (97.2 °F) 11/30/22 1255   Pulse 66 11/30/22 1412   Resp 20 11/30/22 1402   SpO2 97 % 11/30/22 1412   Vitals shown include unvalidated device data.      Event Time   Out of Recovery 12:52:00         Pain/Romel Score: Pain Rating Prior to Med Admin: 0 (11/30/2022  9:18 AM)  Romel Score: 10 (11/30/2022 12:50 PM)  Modified Romel Score: 19 (11/30/2022  3:08 PM)

## 2022-12-02 ENCOUNTER — OFFICE VISIT (OUTPATIENT)
Dept: ORTHOPEDICS | Facility: CLINIC | Age: 62
End: 2022-12-02
Payer: COMMERCIAL

## 2022-12-02 DIAGNOSIS — Z98.890 STATUS POST ARTHROSCOPY OF LEFT SHOULDER: Primary | ICD-10-CM

## 2022-12-02 PROCEDURE — 1160F RVW MEDS BY RX/DR IN RCRD: CPT | Mod: CPTII,,, | Performed by: ORTHOPAEDIC SURGERY

## 2022-12-02 PROCEDURE — 1159F PR MEDICATION LIST DOCUMENTED IN MEDICAL RECORD: ICD-10-PCS | Mod: CPTII,,, | Performed by: ORTHOPAEDIC SURGERY

## 2022-12-02 PROCEDURE — 1160F PR REVIEW ALL MEDS BY PRESCRIBER/CLIN PHARMACIST DOCUMENTED: ICD-10-PCS | Mod: CPTII,,, | Performed by: ORTHOPAEDIC SURGERY

## 2022-12-02 PROCEDURE — 99024 POSTOP FOLLOW-UP VISIT: CPT | Mod: ,,, | Performed by: ORTHOPAEDIC SURGERY

## 2022-12-02 PROCEDURE — 1159F MED LIST DOCD IN RCRD: CPT | Mod: CPTII,,, | Performed by: ORTHOPAEDIC SURGERY

## 2022-12-02 PROCEDURE — 99024 PR POST-OP FOLLOW-UP VISIT: ICD-10-PCS | Mod: ,,, | Performed by: ORTHOPAEDIC SURGERY

## 2022-12-02 NOTE — PROGRESS NOTES
Orthopaedic Clinic  Orthopedic Clinic Note      Chief Complaint:   Chief Complaint   Patient presents with    Left Shoulder - Post-op Evaluation    Post-op Evaluation     Post OP [facility] Left shoulder scope sx 11/30/22 GL 2/2/23       Referring Physician: No ref. provider found      History of Present Illness:    11/30/2022 PROCEDURES:  1.  Left diagnostic shoulder arthroscopy  2.  Arthroscopic biceps tenodesis  3.  Distal clavicle resection  4.  Arthroscopic rotator cuff debridement with PRP (platelet rich plasma) injection  12/02/2022 Patient presents 2 days status post the above noted procedure.  Doing well with no major issues or concerns.  Pain well controlled.  Compliant with abduction sling.      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;  Service: Orthopedics;  Laterality: Left;    ARTHROSCOPY OF SHOULDER WITH REMOVAL OF DISTAL CLAVICLE Left 11/30/2022    Procedure: ARTHROSCOPY, SHOULDER, WITH DISTAL CLAVICLE EXCISION;  Surgeon: Itz Curtis MD;  Location: [facility];  Service: Orthopedics;  Laterality: Left;    ARTHROSCOPY,SHOULDER,WITH BICEPS TENODESIS Left 11/30/2022    Procedure: ARTHROSCOPY,SHOULDER,WITH BICEPS TENODESIS;  Surgeon: Itz Curtis MD;  Location: [facility];  Service: Orthopedics;  Laterality: Left;    COLONOSCOPY  05/15/2013    Extraction of wisdom tooth      HYSTERECTOMY  1998    Repair Upper Lip, External Approach (05/18/2018)      Termination of ectopic pregnancy      x 3    TONSILLECTOMY         Current Outpatient Medications   Medication Sig    acetaminophen (TYLENOL) 500 MG tablet Take 2 tablets (1,000 mg total) by  mouth every 8 (eight) hours.    aspirin (ECOTRIN) 81 MG EC tablet Take 1 tablet (81 mg total) by mouth 2 (two) times a day.    gabapentin (NEURONTIN) 300 MG capsule Take 1 capsule (300 mg total) by mouth every 8 (eight) hours. for 5 days    ketorolac (TORADOL) 10 mg tablet Take 1 tablet (10 mg total) by mouth every 6 (six) hours. for 5 days    [START ON 12/6/2022] meloxicam (MOBIC) 7.5 MG tablet Starting on Post-op Day 6, take Mobic 7.5mg twice a day for 9 days. After 9 days, take Daily, AS NEEDED for pain    methocarbamoL (ROBAXIN) 750 MG Tab Take 1 tablet (750 mg total) by mouth every 6 (six) hours. for 14 days    ondansetron (ZOFRAN-ODT) 4 MG TbDL Take 1 tablet (4 mg total) by mouth every 6 (six) hours as needed (Nausea/Vomiting).    oxyCODONE (ROXICODONE) 5 MG immediate release tablet Take 1 tablet (5 mg total) by mouth every 12 (twelve) hours as needed (For BREAKTHROUGH PAIN ONLY).     No current facility-administered medications for this visit.       Review of patient's allergies indicates:   Allergen Reactions    Azithromycin      Other reaction(s): VOMITING    Metformin Other (See Comments)     Headache       Family History   Problem Relation Age of Onset    Thyroid disease Mother     Diabetes Mother     Atrial fibrillation Mother     Depression Mother     Osteoarthritis Mother     Kidney disease Mother     Mental illness Mother     Vision loss Mother         Macular degeneration    Osteoarthritis Father     Hypertension Father     Asthma Father     Coronary artery disease Father     Arthritis Father     Heart disease Father     Depression Sister     Asthma Sister     Mental illness Sister        Social History     Socioeconomic History    Marital status:      Spouse name: Adeel    Number of children: 0   Occupational History    Occupation: [employer]   Tobacco Use    Smoking status: Former     Packs/day: 0.50     Years: 38.00     Pack years: 19.00     Types: Cigarettes     Start date:  1983     Quit date: 2021     Years since quittin.0    Smokeless tobacco: Never   Substance and Sexual Activity    Alcohol use: Yes     Alcohol/week: 1.0 standard drink     Types: 1 Shots of liquor per week     Comment: 3 times per year    Drug use: Never    Sexual activity: Yes     Partners: Male     Birth control/protection: Post-menopausal           Review of Systems:  All review of systems negative except for those stated in the HPI.    Examination:    Vital Signs:  There were no vitals filed for this visit.    There is no height or weight on file to calculate BMI.    Physical Examination:  General: Well-developed, well-nourished.  Neuro: Alert and oriented x 3.  Psych: Normal mood and affect.  Left shoulder exam: Incisions clean, dry, and intact. No signs of infection. Neurovascular intact distally. Sensation intact.      Assessment: Status post arthroscopy of left shoulder  -     Ambulatory referral/consult to Physical/Occupational Therapy; Future; Expected date: 2022    Plan: Reviewed the intraoperative arthroscopic videos with the patient today.  Patient will wear the sling with abduction pillow for another 4 weeks, and then just a sling for another 2 weeks. Referral entered for outpatient physical therapy.  Continue previously prescribed medications as needed for pain.  She will return to clinic in 6-8 weeks for reevaluation.  She verbalized understanding of the plan of care with no further questions.    Itz Curtis MD personally performed the services described in this documentation, including but not limited to patient's history, physical examination, and assessment and plan of care. All medical record entries made by AMARILIS Haney were performed at his direction and in his presence. The medical record was reviewed and is accurate and complete.         Follow up in about 7 weeks (around 2023) for Reevaluation.      DISCLAIMER: This note may have been dictated using voice  recognition software and may contain grammatical errors.     NOTE: Consult report sent to referring provider via [employer] EMR.

## 2023-01-26 ENCOUNTER — TELEPHONE (OUTPATIENT)
Dept: ORTHOPEDICS | Facility: CLINIC | Age: 63
End: 2023-01-26

## 2023-01-26 ENCOUNTER — OFFICE VISIT (OUTPATIENT)
Dept: ORTHOPEDICS | Facility: CLINIC | Age: 63
End: 2023-01-26
Payer: COMMERCIAL

## 2023-01-26 VITALS
HEIGHT: 70 IN | SYSTOLIC BLOOD PRESSURE: 135 MMHG | HEART RATE: 101 BPM | WEIGHT: 245 LBS | DIASTOLIC BLOOD PRESSURE: 83 MMHG | BODY MASS INDEX: 35.07 KG/M2

## 2023-01-26 DIAGNOSIS — Z98.890 STATUS POST ARTHROSCOPY OF LEFT SHOULDER: Primary | ICD-10-CM

## 2023-01-26 PROCEDURE — 3075F PR MOST RECENT SYSTOLIC BLOOD PRESS GE 130-139MM HG: ICD-10-PCS | Mod: CPTII,,, | Performed by: ORTHOPAEDIC SURGERY

## 2023-01-26 PROCEDURE — 3008F BODY MASS INDEX DOCD: CPT | Mod: CPTII,,, | Performed by: ORTHOPAEDIC SURGERY

## 2023-01-26 PROCEDURE — 1159F MED LIST DOCD IN RCRD: CPT | Mod: CPTII,,, | Performed by: ORTHOPAEDIC SURGERY

## 2023-01-26 PROCEDURE — 3079F DIAST BP 80-89 MM HG: CPT | Mod: CPTII,,, | Performed by: ORTHOPAEDIC SURGERY

## 2023-01-26 PROCEDURE — 3079F PR MOST RECENT DIASTOLIC BLOOD PRESSURE 80-89 MM HG: ICD-10-PCS | Mod: CPTII,,, | Performed by: ORTHOPAEDIC SURGERY

## 2023-01-26 PROCEDURE — 3075F SYST BP GE 130 - 139MM HG: CPT | Mod: CPTII,,, | Performed by: ORTHOPAEDIC SURGERY

## 2023-01-26 PROCEDURE — 3008F PR BODY MASS INDEX (BMI) DOCUMENTED: ICD-10-PCS | Mod: CPTII,,, | Performed by: ORTHOPAEDIC SURGERY

## 2023-01-26 PROCEDURE — 99024 POSTOP FOLLOW-UP VISIT: CPT | Mod: ,,, | Performed by: ORTHOPAEDIC SURGERY

## 2023-01-26 PROCEDURE — 1159F PR MEDICATION LIST DOCUMENTED IN MEDICAL RECORD: ICD-10-PCS | Mod: CPTII,,, | Performed by: ORTHOPAEDIC SURGERY

## 2023-01-26 PROCEDURE — 99024 PR POST-OP FOLLOW-UP VISIT: ICD-10-PCS | Mod: ,,, | Performed by: ORTHOPAEDIC SURGERY

## 2023-01-26 NOTE — PROGRESS NOTES
[facility]  Orthopedic Clinic Note      Chief Complaint:   Chief Complaint   Patient presents with    Left Shoulder - Follow-up     8 wk f/u lt shoulder sx. 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 Has sutures migrating out of one spot. No major pain only with quick mvmt and positions. When stiff takes tylenol which helps.       Referring Physician: No ref. provider found      History of Present Illness:    11/30/2022 PROCEDURES:  1.  Left diagnostic shoulder arthroscopy  2.  Arthroscopic biceps tenodesis  3.  Distal clavicle resection  4.  Arthroscopic rotator cuff debridement with PRP (platelet rich plasma) injection  12/02/2022 Patient presents 2 days status post the above noted procedure.  Doing well with no major issues or concerns.  Pain well controlled.  Compliant with abduction sling.  01/26/2023 this patient comes in today approximately 8 weeks out from the above-noted procedure.  No major issues or complaints.  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      Past Medical History:   Diagnosis Date    Depression     DJD (degenerative joint disease) of right wrist     SURAJ (generalized anxiety disorder)     Hypertension     Type 2 diabetes mellitus without complications        Past Surgical History:   Procedure Laterality Date    ARTHROSCOPIC DEBRIDEMENT OF SHOULDER Left 11/30/2022    Procedure: DEBRIDEMENT, SHOULDER, ARTHROSCOPIC;  Surgeon: Itz Curtis MD;  Location: [facility];  Service: Orthopedics;  Laterality: Left;    ARTHROSCOPY OF SHOULDER WITH REMOVAL OF DISTAL CLAVICLE Left 11/30/2022    Procedure: ARTHROSCOPY, SHOULDER, WITH DISTAL CLAVICLE EXCISION;  Surgeon: Itz Curtis MD;  Location: [facility];  Service: Orthopedics;  Laterality: Left;    ARTHROSCOPY,SHOULDER,WITH BICEPS TENODESIS Left 11/30/2022    Procedure: ARTHROSCOPY,SHOULDER,WITH BICEPS TENODESIS;  Surgeon: Itz Curtis MD;   Location: [facility] OR;  Service: Orthopedics;  Laterality: Left;    COLONOSCOPY  05/15/2013    Extraction of wisdom tooth      HYSTERECTOMY  1998    Repair Upper Lip, External Approach (2018)      Termination of ectopic pregnancy      x 3    TONSILLECTOMY         Current Outpatient Medications   Medication Sig    semaglutide (OZEMPIC) 0.25 mg or 0.5 mg(2 mg/1.5 mL) pen injector Inject 0.25 mg into the skin every 7 days.    sodium zirconium cyclosilicate (LOKELMA) 10 gram packet Take 1 packet (10 g total) by mouth once daily. Mix entire contents of packet(s) into drinking glass containing 3 tablespoons of water; stir well and drink immediately. Add water and repeat until no powder remains to receive entire dose. (Patient not taking: Reported on 2023)     No current facility-administered medications for this visit.       Review of patient's allergies indicates:   Allergen Reactions    Azithromycin      Other reaction(s): VOMITING    Metformin Other (See Comments)     Headache       Family History   Problem Relation Age of Onset    Thyroid disease Mother     Diabetes Mother     Atrial fibrillation Mother     Depression Mother     Osteoarthritis Mother     Kidney disease Mother     Mental illness Mother     Vision loss Mother         Macular degeneration    Osteoarthritis Father     Hypertension Father     Asthma Father     Coronary artery disease Father     Arthritis Father     Heart disease Father     Depression Sister     Asthma Sister     Mental illness Sister        Social History     Socioeconomic History    Marital status:      Spouse name: Adeel    Number of children: 0   Occupational History    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   Tobacco Use    Smoking status: Former     Packs/day: 0.50     Years: 38.00     Pack years: 19.00     Types: Cigarettes     Start date: 1983     Quit date: 2021     Years since quittin.1    Smokeless tobacco: Never   Substance and Sexual Activity    Alcohol  "use: Yes     Alcohol/week: 1.0 standard drink     Types: 1 Shots of liquor per week     Comment: 3 times per year    Drug use: Never    Sexual activity: Yes     Partners: Male     Birth control/protection: Post-menopausal           Review of Systems:  All review of systems negative except for those stated in the HPI.    Examination:    Vital Signs:    Vitals:    01/26/23 0824 01/26/23 0826   BP: 135/83    Pulse: 101    Weight: 111.1 kg (245 lb)    Height: 5' 10" (1.778 m)    PainSc:    6       Body mass index is 35.15 kg/m².    Physical Examination:  General: Well-developed, well-nourished.  Neuro: Alert and oriented x 3.  Psych: Normal mood and affect.  Left Shoulder Exam:  No obvious deformity. No medial or lateral scapula winging. Forward flexion to 170 degrees and abduction to 160 degrees and external rotation 80 degrees is and internal rotation is 70 degrees. Negative empty can, Whipple, Drop Arm Test, Ballard, impingement, AC joint tenderness. Negative biceps  groove tenderness, Dejesus´s, Yergason´s, Speed test. Negative Apprehension and Relocation test. 4-/5 strength, normal skin appearance and palpable pulses distally. Sensibility normal.        Assessment: Status post arthroscopy of left shoulder        Plan:  She is progress well.  However, I do not think she is ready for return back to work due to her job duties.  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  For this reason we will keep her out of work for another month and she will continue with physical therapy.  I suspect that she should be able to return back to work in approximately 4-6 weeks.  I will see her back in 6 weeks.    Itz Curtis MD personally performed the services described in this documentation, including but not limited to patient's history, physical examination, and assessment and plan of care. All medical record entries made " by Iman Cleveland, APRN were performed at his direction and in his presence. The medical record was reviewed and is accurate and complete.         Follow up in about 3 months (around 4/26/2023) for Reevaluation.      DISCLAIMER: This note may have been dictated using voice recognition software and may contain grammatical errors.     NOTE: Consult report sent to referring provider via [employer] EMR.

## 2023-01-27 DIAGNOSIS — Z98.890 STATUS POST ARTHROSCOPY OF LEFT SHOULDER: Primary | ICD-10-CM

## 2023-02-16 DIAGNOSIS — Z98.890 STATUS POST ARTHROSCOPY OF LEFT SHOULDER: Primary | ICD-10-CM

## 2023-02-16 RX ORDER — DICLOFENAC SODIUM 50 MG/1
50 TABLET, DELAYED RELEASE ORAL 2 TIMES DAILY
Qty: 60 TABLET | Refills: 0 | Status: SHIPPED | OUTPATIENT
Start: 2023-02-16 | End: 2023-06-22

## 2023-02-16 RX ORDER — DICLOFENAC SODIUM 50 MG/1
50 TABLET, DELAYED RELEASE ORAL 2 TIMES DAILY
COMMUNITY
End: 2023-02-16 | Stop reason: SDUPTHER

## 2023-03-09 ENCOUNTER — OFFICE VISIT (OUTPATIENT)
Dept: ORTHOPEDICS | Facility: CLINIC | Age: 63
End: 2023-03-09
Payer: COMMERCIAL

## 2023-03-09 VITALS
HEART RATE: 105 BPM | HEIGHT: 70 IN | DIASTOLIC BLOOD PRESSURE: 84 MMHG | BODY MASS INDEX: 35.07 KG/M2 | SYSTOLIC BLOOD PRESSURE: 122 MMHG | WEIGHT: 245 LBS

## 2023-03-09 DIAGNOSIS — Z98.890 STATUS POST ARTHROSCOPY OF LEFT SHOULDER: Primary | ICD-10-CM

## 2023-03-09 DIAGNOSIS — M77.12 LEFT LATERAL EPICONDYLITIS: ICD-10-CM

## 2023-03-09 PROCEDURE — 3074F PR MOST RECENT SYSTOLIC BLOOD PRESSURE < 130 MM HG: ICD-10-PCS | Mod: CPTII,,, | Performed by: ORTHOPAEDIC SURGERY

## 2023-03-09 PROCEDURE — 3008F PR BODY MASS INDEX (BMI) DOCUMENTED: ICD-10-PCS | Mod: CPTII,,, | Performed by: ORTHOPAEDIC SURGERY

## 2023-03-09 PROCEDURE — 3079F PR MOST RECENT DIASTOLIC BLOOD PRESSURE 80-89 MM HG: ICD-10-PCS | Mod: CPTII,,, | Performed by: ORTHOPAEDIC SURGERY

## 2023-03-09 PROCEDURE — 3079F DIAST BP 80-89 MM HG: CPT | Mod: CPTII,,, | Performed by: ORTHOPAEDIC SURGERY

## 2023-03-09 PROCEDURE — 1159F MED LIST DOCD IN RCRD: CPT | Mod: CPTII,,, | Performed by: ORTHOPAEDIC SURGERY

## 2023-03-09 PROCEDURE — 99214 OFFICE O/P EST MOD 30 MIN: CPT | Mod: ,,, | Performed by: ORTHOPAEDIC SURGERY

## 2023-03-09 PROCEDURE — 1160F RVW MEDS BY RX/DR IN RCRD: CPT | Mod: CPTII,,, | Performed by: ORTHOPAEDIC SURGERY

## 2023-03-09 PROCEDURE — 99214 PR OFFICE/OUTPT VISIT, EST, LEVL IV, 30-39 MIN: ICD-10-PCS | Mod: ,,, | Performed by: ORTHOPAEDIC SURGERY

## 2023-03-09 PROCEDURE — 3008F BODY MASS INDEX DOCD: CPT | Mod: CPTII,,, | Performed by: ORTHOPAEDIC SURGERY

## 2023-03-09 PROCEDURE — 3074F SYST BP LT 130 MM HG: CPT | Mod: CPTII,,, | Performed by: ORTHOPAEDIC SURGERY

## 2023-03-09 PROCEDURE — 1159F PR MEDICATION LIST DOCUMENTED IN MEDICAL RECORD: ICD-10-PCS | Mod: CPTII,,, | Performed by: ORTHOPAEDIC SURGERY

## 2023-03-09 PROCEDURE — 1160F PR REVIEW ALL MEDS BY PRESCRIBER/CLIN PHARMACIST DOCUMENTED: ICD-10-PCS | Mod: CPTII,,, | Performed by: ORTHOPAEDIC SURGERY

## 2023-03-09 RX ORDER — METHYLPREDNISOLONE 4 MG/1
TABLET ORAL
Qty: 1 EACH | Refills: 0 | Status: SHIPPED | OUTPATIENT
Start: 2023-03-09 | End: 2023-06-22

## 2023-03-09 NOTE — PROGRESS NOTES
[facility] Clinic  Orthopedic Clinic Note      Chief Complaint:   Chief Complaint   Patient presents with    Left Shoulder - Follow-up     3mo sp lt shoulder sx 11/30/22. Pt states she had tendonitis about two weeks ago which is resolved. States so far so good with the shoulder and is doing PT twice a week.       Referring Physician: No ref. provider found      History of Present Illness:    11/30/2022 PROCEDURES:  1.  Left diagnostic shoulder arthroscopy  2.  Arthroscopic biceps tenodesis  3.  Distal clavicle resection  4.  Arthroscopic rotator cuff debridement with PRP (platelet rich plasma) injection  12/02/2022 Patient presents 2 days status post the above noted procedure.  Doing well with no major issues or concerns.  Pain well controlled.  Compliant with abduction sling.  01/26/2023 this patient comes in today approximately 8 weeks out from the above-noted procedure.  No major issues or complaints.  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  03/09/2023 patient presents a little over 3 months status post the above-noted procedure.  She completed formal physical therapy and transition to a home exercise program that she is performing at the [facility] 3 days a week.  She continues to work on internal rotation and still notes some weakness in the upper extremity.  She also complains of elbow pain localized over the lateral aspect of the elbow for the last few weeks.  The pain is exacerbated by movement of the wrist.  It is described as a sharp pain over the lateral aspect of the elbow.  She is been taking previously prescribed diclofenac for these symptoms.      Past Medical History:   Diagnosis Date    Depression     DJD (degenerative joint disease) of right wrist     SURAJ (generalized anxiety disorder)     Hypertension     Type 2 diabetes mellitus without complications        Past Surgical History:   Procedure Laterality Date     ARTHROSCOPIC DEBRIDEMENT OF SHOULDER Left 11/30/2022    Procedure: DEBRIDEMENT, SHOULDER, ARTHROSCOPIC;  Surgeon: Itz Curtis MD;  Location: [facility] OR;  Service: Orthopedics;  Laterality: Left;    ARTHROSCOPY OF SHOULDER WITH REMOVAL OF DISTAL CLAVICLE Left 11/30/2022    Procedure: ARTHROSCOPY, SHOULDER, WITH DISTAL CLAVICLE EXCISION;  Surgeon: Itz Curtis MD;  Location: [facility] OR;  Service: Orthopedics;  Laterality: Left;    ARTHROSCOPY,SHOULDER,WITH BICEPS TENODESIS Left 11/30/2022    Procedure: ARTHROSCOPY,SHOULDER,WITH BICEPS TENODESIS;  Surgeon: Itz Curtis MD;  Location: [facility] OR;  Service: Orthopedics;  Laterality: Left;    COLONOSCOPY  05/15/2013    Extraction of wisdom tooth      HYSTERECTOMY  1998    Repair Upper Lip, External Approach (05/18/2018)      Termination of ectopic pregnancy      x 3    TONSILLECTOMY         Current Outpatient Medications   Medication Sig    diclofenac (VOLTAREN) 50 MG EC tablet Take 1 tablet (50 mg total) by mouth 2 (two) times daily.    semaglutide (OZEMPIC) 0.25 mg or 0.5 mg(2 mg/1.5 mL) pen injector Inject 0.5 mg into the skin every 7 days.    methylPREDNISolone (MEDROL DOSEPACK) 4 mg tablet use as directed    sodium zirconium cyclosilicate (LOKELMA) 10 gram packet Take 1 packet (10 g total) by mouth once daily. Mix entire contents of packet(s) into drinking glass containing 3 tablespoons of water; stir well and drink immediately. Add water and repeat until no powder remains to receive entire dose. (Patient not taking: Reported on 1/26/2023)     No current facility-administered medications for this visit.       Review of patient's allergies indicates:   Allergen Reactions    Azithromycin      Other reaction(s): VOMITING    Metformin Other (See Comments)     Headache       Family History   Problem Relation Age of Onset    Thyroid disease Mother     Diabetes Mother     Atrial fibrillation Mother     Depression Mother     Osteoarthritis Mother     Kidney disease Mother     Mental  "illness Mother     Vision loss Mother         Macular degeneration    Osteoarthritis Father     Hypertension Father     Asthma Father     Coronary artery disease Father     Arthritis Father     Heart disease Father     Depression Sister     Asthma Sister     Mental illness Sister        Social History     Socioeconomic History    Marital status:      Spouse name: Adeel    Number of children: 0   Occupational History    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    Examination:    Vital Signs:    Vitals:    23 0850   BP: 122/84   Pulse: 105   Weight: 111.1 kg (245 lb)   Height: 5' 10" (1.778 m)       Body mass index is 35.15 kg/m².    Physical Examination:  General: Well-developed, well-nourished.  Neuro: Alert and oriented x 3.  Psych: Normal mood and affect.  Left Shoulder Exam:  No obvious deformity. No medial or lateral scapula winging. Forward flexion to 170 degrees and abduction to 160 degrees and external rotation 80 degrees is and internal rotation is 70 degrees. Negative empty can, Whipple, Drop Arm Test, Ballard, impingement, AC joint tenderness. Negative biceps  groove tenderness, Dejesus´s, Yergason´s, Speed test. Negative Apprehension and Relocation test. 4-/5 strength, normal skin appearance and palpable pulses distally. Sensibility normal.  Left Elbow Exam:  No obvious deformity. Range of motion is 0 to 130 degrees. " Negative varus and valgus stress test. Supination and pronation to 90 degrees.  Positive tenderness to palpation over the lateral epicondyle.  Pain with resisted wrist extension.  Negative tenderness to palpation over the medial epicondyle. Negative Tinel´s test. No olecranon tenderness. 4/5 strength, normal skin appearance and palpable pulses distally. Sensibility normal.        Assessment: Status post arthroscopy of left shoulder  -     methylPREDNISolone (MEDROL DOSEPACK) 4 mg tablet; use as directed  Dispense: 1 each; Refill: 0    Left lateral epicondylitis  -     methylPREDNISolone (MEDROL DOSEPACK) 4 mg tablet; use as directed  Dispense: 1 each; Refill: 0        Plan:  She will continue her home exercise program.  Prescription routed for Medrol Dosepak to address her elbow and some continued symptoms in her shoulder.  Continue previously prescribed diclofenac.  She will remain out of work until follow-up due to her strength and range of motion limitations.  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  We will discuss full return to work at follow-up.  She will return to clinic in approximately 4-6 weeks for re-evaluation.  She verbalized understanding of the plan of care with no further questions.    Itz Curtis MD personally performed the services described in this documentation, including but not limited to patient's history, physical examination, and assessment and plan of care. All medical record entries made by AMARILIS Haney were performed at his direction and in his presence. The medical record was reviewed and is accurate and complete.         Follow up in about 5 weeks (around 4/13/2023) for Reevaluation.      DISCLAIMER: This note may have been dictated using voice recognition software and may contain grammatical errors.     NOTE: Consult report sent to referring provider via [employer] EMR.

## 2023-04-11 ENCOUNTER — OFFICE VISIT (OUTPATIENT)
Dept: ORTHOPEDICS | Facility: CLINIC | Age: 63
End: 2023-04-11
Payer: COMMERCIAL

## 2023-04-11 VITALS
SYSTOLIC BLOOD PRESSURE: 120 MMHG | BODY MASS INDEX: 35.07 KG/M2 | HEART RATE: 118 BPM | HEIGHT: 70 IN | DIASTOLIC BLOOD PRESSURE: 77 MMHG | WEIGHT: 245 LBS

## 2023-04-11 DIAGNOSIS — M75.22 LEFT BICIPITAL TENOSYNOVITIS: ICD-10-CM

## 2023-04-11 DIAGNOSIS — M67.819 TENDINOSIS OF ROTATOR CUFF: ICD-10-CM

## 2023-04-11 DIAGNOSIS — Z98.890 STATUS POST ARTHROSCOPY OF LEFT SHOULDER: Primary | ICD-10-CM

## 2023-04-11 PROCEDURE — 3008F BODY MASS INDEX DOCD: CPT | Mod: CPTII,,, | Performed by: ORTHOPAEDIC SURGERY

## 2023-04-11 PROCEDURE — 99213 OFFICE O/P EST LOW 20 MIN: CPT | Mod: ,,, | Performed by: ORTHOPAEDIC SURGERY

## 2023-04-11 PROCEDURE — 3078F DIAST BP <80 MM HG: CPT | Mod: CPTII,,, | Performed by: ORTHOPAEDIC SURGERY

## 2023-04-11 PROCEDURE — 1159F MED LIST DOCD IN RCRD: CPT | Mod: CPTII,,, | Performed by: ORTHOPAEDIC SURGERY

## 2023-04-11 PROCEDURE — 1159F PR MEDICATION LIST DOCUMENTED IN MEDICAL RECORD: ICD-10-PCS | Mod: CPTII,,, | Performed by: ORTHOPAEDIC SURGERY

## 2023-04-11 PROCEDURE — 99213 PR OFFICE/OUTPT VISIT, EST, LEVL III, 20-29 MIN: ICD-10-PCS | Mod: ,,, | Performed by: ORTHOPAEDIC SURGERY

## 2023-04-11 PROCEDURE — 3074F PR MOST RECENT SYSTOLIC BLOOD PRESSURE < 130 MM HG: ICD-10-PCS | Mod: CPTII,,, | Performed by: ORTHOPAEDIC SURGERY

## 2023-04-11 PROCEDURE — 3008F PR BODY MASS INDEX (BMI) DOCUMENTED: ICD-10-PCS | Mod: CPTII,,, | Performed by: ORTHOPAEDIC SURGERY

## 2023-04-11 PROCEDURE — 3074F SYST BP LT 130 MM HG: CPT | Mod: CPTII,,, | Performed by: ORTHOPAEDIC SURGERY

## 2023-04-11 PROCEDURE — 3078F PR MOST RECENT DIASTOLIC BLOOD PRESSURE < 80 MM HG: ICD-10-PCS | Mod: CPTII,,, | Performed by: ORTHOPAEDIC SURGERY

## 2023-04-11 PROCEDURE — 1160F RVW MEDS BY RX/DR IN RCRD: CPT | Mod: CPTII,,, | Performed by: ORTHOPAEDIC SURGERY

## 2023-04-11 PROCEDURE — 1160F PR REVIEW ALL MEDS BY PRESCRIBER/CLIN PHARMACIST DOCUMENTED: ICD-10-PCS | Mod: CPTII,,, | Performed by: ORTHOPAEDIC SURGERY

## 2023-04-11 NOTE — PROGRESS NOTES
[facility]edic Clinic  Orthopedic Clinic Note      Chief Complaint:   Chief Complaint   Patient presents with    Left Shoulder - Follow-up    Follow-up     6wk f/u lt shoulder scope sx 11/30/22-2/2/23. does stuff PT told her to at home but no longer going to PT. pt states lt shoulder still has pain and stiffness and feels like tendonitis. takes xtra strength tylenol and baby aspirin every 6hrs which reduces pain level from an 8(moving) sharp quick pain and spasms into a 7 and massages it and it goes away. the medications help with the analgesic.    Advice Only     would also like to talk about other possible options for diclofenac       Referring Physician: No ref. provider found      History of Present Illness:    11/30/2022 PROCEDURES:  1.  Left diagnostic shoulder arthroscopy  2.  Arthroscopic biceps tenodesis  3.  Distal clavicle resection  4.  Arthroscopic rotator cuff debridement with PRP (platelet rich plasma) injection  12/02/2022 Patient presents 2 days status post the above noted procedure.  Doing well with no major issues or concerns.  Pain well controlled.  Compliant with abduction sling.  01/26/2023 this patient comes in today approximately 8 weeks out from the above-noted procedure.  No major issues or complaints.  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  03/09/2023 patient presents a little over 3 months status post the above-noted procedure.  She completed formal physical therapy and transition to a home exercise program that she is performing at the [facility] 3 days a week.  She continues to work on internal rotation and still notes some weakness in the upper extremity.  She also complains of elbow pain localized over the lateral aspect of the elbow for the last few weeks.  The pain is exacerbated by movement of the wrist.  It is described as a sharp pain over the lateral aspect of the elbow.  She is been taking previously  prescribed diclofenac for these symptoms.  04/11/2023 this patient is approximately 5 months out from the above-noted procedure.  Unfortunately she continues to have a lot of biceps symptoms and a sense of stiffness in that left shoulder.  She was discharged from physical therapy.  She is currently taking over-the-counter medication for symptoms.  She is also using cryotherapy to help with her symptoms.      Past Medical History:   Diagnosis Date    Depression     DJD (degenerative joint disease) of right wrist     SURAJ (generalized anxiety disorder)     Hypertension     Type 2 diabetes mellitus without complications        Past Surgical History:   Procedure Laterality Date    ARTHROSCOPIC DEBRIDEMENT OF SHOULDER Left 11/30/2022    Procedure: DEBRIDEMENT, SHOULDER, ARTHROSCOPIC;  Surgeon: Itz Curtis MD;  Location: [facility] OR;  Service: Orthopedics;  Laterality: Left;    ARTHROSCOPY OF SHOULDER WITH REMOVAL OF DISTAL CLAVICLE Left 11/30/2022    Procedure: ARTHROSCOPY, SHOULDER, WITH DISTAL CLAVICLE EXCISION;  Surgeon: Itz Curtis MD;  Location: [facility] OR;  Service: Orthopedics;  Laterality: Left;    ARTHROSCOPY,SHOULDER,WITH BICEPS TENODESIS Left 11/30/2022    Procedure: ARTHROSCOPY,SHOULDER,WITH BICEPS TENODESIS;  Surgeon: Itz Curtis MD;  Location: [facility] OR;  Service: Orthopedics;  Laterality: Left;    COLONOSCOPY  05/15/2013    Extraction of wisdom tooth      HYSTERECTOMY  1998    Repair Upper Lip, External Approach (05/18/2018)      Termination of ectopic pregnancy      x 3    TONSILLECTOMY         Current Outpatient Medications   Medication Sig    diclofenac (VOLTAREN) 50 MG EC tablet Take 1 tablet (50 mg total) by mouth 2 (two) times daily.    methylPREDNISolone (MEDROL DOSEPACK) 4 mg tablet use as directed    semaglutide (OZEMPIC) 0.25 mg or 0.5 mg(2 mg/1.5 mL) pen injector Inject 0.5 mg into the skin every 7 days.    sodium zirconium cyclosilicate (LOKELMA) 10 gram packet Take 1 packet (10 g total) by mouth once  "daily. Mix entire contents of packet(s) into drinking glass containing 3 tablespoons of water; stir well and drink immediately. Add water and repeat until no powder remains to receive entire dose. (Patient not taking: Reported on 2023)     No current facility-administered medications for this visit.       Review of patient's allergies indicates:   Allergen Reactions    Azithromycin      Other reaction(s): VOMITING    Metformin Other (See Comments)     Headache       Family History   Problem Relation Age of Onset    Thyroid disease Mother     Diabetes Mother     Atrial fibrillation Mother     Depression Mother     Osteoarthritis Mother     Kidney disease Mother     Mental illness Mother     Vision loss Mother         Macular degeneration    Osteoarthritis Father     Hypertension Father     Asthma Father     Coronary artery disease Father     Arthritis Father     Heart disease Father     Depression Sister     Asthma Sister     Mental illness Sister        Social History     Socioeconomic History    Marital status:      Spouse name: Adeel    Number of children: 0   Occupational History    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    Examination:    Vital Signs:    Vitals:    23 0904   BP: 120/77   Pulse: (!) 118   Weight: 111.1 kg (245 lb)   Height: 5' 10" (1.778 m)       Body mass index is 35.15 kg/m².    Physical " Examination:  General: Well-developed, well-nourished.  Neuro: Alert and oriented x 3.  Psych: Normal mood and affect.  Left Shoulder Exam:  No obvious deformity. No medial or lateral scapula winging. Forward flexion to 170 degrees and abduction to 160 degrees and external rotation 80 degrees is and internal rotation is 70 degrees. Negative empty can, Whipple, Drop Arm Test, Ballard, impingement, AC joint tenderness. Negative biceps  groove tenderness, Dejesus´s, Yergason´s, Speed test. Negative Apprehension and Relocation test. 4+/5 strength, normal skin appearance and palpable pulses distally. Sensibility normal.  Left Elbow Exam:  No obvious deformity. Range of motion is 0 to 130 degrees. Negative varus and valgus stress test. Supination and pronation to 90 degrees.  Positive tenderness to palpation over the lateral epicondyle.  Pain with resisted wrist extension.  Negative tenderness to palpation over the medial epicondyle. Negative Tinel´s test. No olecranon tenderness. 4/5 strength, normal skin appearance and palpable pulses distally. Sensibility normal.        Assessment: Status post arthroscopy of left shoulder  -     Ambulatory referral/consult to Physical/Occupational Therapy; Future; Expected date: 04/18/2023    Left bicipital tenosynovitis  -     Ambulatory referral/consult to Physical/Occupational Therapy; Future; Expected date: 04/18/2023    Tendinosis of rotator cuff  -     Ambulatory referral/consult to Physical/Occupational Therapy; Future; Expected date: 04/18/2023          Plan:  I would not expect this patient to continue to have the symptoms this far out from this procedure.  However, it is not uncommon to continue to have these ongoing biceps symptoms after her biceps tenodesis.  I think she also could be having some type of scapular dyskinesis.  For this reason, I would like to place her back in physical therapy for another 6 weeks to see if she improves her symptoms.  If she does not, then I  may consider a repeat MRI of the left shoulder.    Itz Curtis MD personally performed the services described in this documentation, including but not limited to patient's history, physical examination, and assessment and plan of care. All medical record entries made by AMARILIS Haney were performed at his direction and in his presence. The medical record was reviewed and is accurate and complete.         No follow-ups on file.      DISCLAIMER: This note may have been dictated using voice recognition software and may contain grammatical errors.     NOTE: Consult report sent to referring provider via [employer] EMR.

## 2023-05-23 ENCOUNTER — OFFICE VISIT (OUTPATIENT)
Dept: ORTHOPEDICS | Facility: CLINIC | Age: 63
End: 2023-05-23
Payer: COMMERCIAL

## 2023-05-23 VITALS — WEIGHT: 245 LBS | HEIGHT: 70 IN | BODY MASS INDEX: 35.07 KG/M2

## 2023-05-23 DIAGNOSIS — M75.22 LEFT BICIPITAL TENOSYNOVITIS: ICD-10-CM

## 2023-05-23 DIAGNOSIS — Z98.890 STATUS POST ARTHROSCOPY OF LEFT SHOULDER: Primary | ICD-10-CM

## 2023-05-23 PROCEDURE — 99213 PR OFFICE/OUTPT VISIT, EST, LEVL III, 20-29 MIN: ICD-10-PCS | Mod: ,,, | Performed by: ORTHOPAEDIC SURGERY

## 2023-05-23 PROCEDURE — 99213 OFFICE O/P EST LOW 20 MIN: CPT | Mod: ,,, | Performed by: ORTHOPAEDIC SURGERY

## 2023-05-23 PROCEDURE — 1159F MED LIST DOCD IN RCRD: CPT | Mod: CPTII,,, | Performed by: ORTHOPAEDIC SURGERY

## 2023-05-23 PROCEDURE — 3008F PR BODY MASS INDEX (BMI) DOCUMENTED: ICD-10-PCS | Mod: CPTII,,, | Performed by: ORTHOPAEDIC SURGERY

## 2023-05-23 PROCEDURE — 1159F PR MEDICATION LIST DOCUMENTED IN MEDICAL RECORD: ICD-10-PCS | Mod: CPTII,,, | Performed by: ORTHOPAEDIC SURGERY

## 2023-05-23 PROCEDURE — 3008F BODY MASS INDEX DOCD: CPT | Mod: CPTII,,, | Performed by: ORTHOPAEDIC SURGERY

## 2023-05-23 NOTE — PROGRESS NOTES
[facility]  Orthopedic Clinic Note      Chief Complaint:   Chief Complaint   Patient presents with    Left Shoulder - Pain    Ankle Pain     6 month sp left shoulder ATS debridement, distal clavicle removal and biceps tenodesis sx 11/30/22, this patient presents today with almost full ROM and has no complaints and would like to maybe go back to work next week       Referring Physician: No ref. provider found      History of Present Illness:    11/30/2022 PROCEDURES:  1.  Left diagnostic shoulder arthroscopy  2.  Arthroscopic biceps tenodesis  3.  Distal clavicle resection  4.  Arthroscopic rotator cuff debridement with PRP (platelet rich plasma) injection  12/02/2022 Patient presents 2 days status post the above noted procedure.  Doing well with no major issues or concerns.  Pain well controlled.  Compliant with abduction sling.  01/26/2023 this patient comes in today approximately 8 weeks out from the above-noted procedure.  No major issues or complaints.  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  03/09/2023 patient presents a little over 3 months status post the above-noted procedure.  She completed formal physical therapy and transition to a home exercise program that she is performing at the [facility] 3 days a week.  She continues to work on internal rotation and still notes some weakness in the upper extremity.  She also complains of elbow pain localized over the lateral aspect of the elbow for the last few weeks.  The pain is exacerbated by movement of the wrist.  It is described as a sharp pain over the lateral aspect of the elbow.  She is been taking previously prescribed diclofenac for these symptoms.  04/11/2023 this patient is approximately 5 months out from the above-noted procedure.  Unfortunately she continues to have a lot of biceps symptoms and a sense of stiffness in that left shoulder.  She was discharged from  physical therapy.  She is currently taking over-the-counter medication for symptoms.  She is also using cryotherapy to help with her symptoms.  05/23/2023 This patient is approximately 6 months out from the above-noted procedure. She has improved significantly from previous appointment after she switched physical therapy locations. She has full ROM without pain and was able to hook a bra behind her the other day.       Past Medical History:   Diagnosis Date    Depression     DJD (degenerative joint disease) of right wrist     SURAJ (generalized anxiety disorder)     Hypertension     Type 2 diabetes mellitus without complications        Past Surgical History:   Procedure Laterality Date    ARTHROSCOPIC DEBRIDEMENT OF SHOULDER Left 11/30/2022    Procedure: DEBRIDEMENT, SHOULDER, ARTHROSCOPIC;  Surgeon: Itz Curtis MD;  Location: [facility] OR;  Service: Orthopedics;  Laterality: Left;    ARTHROSCOPY OF SHOULDER WITH REMOVAL OF DISTAL CLAVICLE Left 11/30/2022    Procedure: ARTHROSCOPY, SHOULDER, WITH DISTAL CLAVICLE EXCISION;  Surgeon: Itz Curtis MD;  Location: [facility] OR;  Service: Orthopedics;  Laterality: Left;    ARTHROSCOPY,SHOULDER,WITH BICEPS TENODESIS Left 11/30/2022    Procedure: ARTHROSCOPY,SHOULDER,WITH BICEPS TENODESIS;  Surgeon: Itz Curtis MD;  Location: [facility] OR;  Service: Orthopedics;  Laterality: Left;    COLONOSCOPY  05/15/2013    Extraction of wisdom tooth      HYSTERECTOMY  1998    Repair Upper Lip, External Approach (05/18/2018)      Termination of ectopic pregnancy      x 3    TONSILLECTOMY         Current Outpatient Medications   Medication Sig    diclofenac (VOLTAREN) 50 MG EC tablet Take 1 tablet (50 mg total) by mouth 2 (two) times daily.    semaglutide (OZEMPIC) 0.25 mg or 0.5 mg(2 mg/1.5 mL) pen injector Inject 0.5 mg into the skin every 7 days.    methylPREDNISolone (MEDROL DOSEPACK) 4 mg tablet use as directed (Patient not taking: Reported on 5/23/2023)    sodium zirconium cyclosilicate (LOKELMA)  "10 gram packet Take 1 packet (10 g total) by mouth once daily. Mix entire contents of packet(s) into drinking glass containing 3 tablespoons of water; stir well and drink immediately. Add water and repeat until no powder remains to receive entire dose. (Patient not taking: Reported on 2023)     No current facility-administered medications for this visit.       Review of patient's allergies indicates:   Allergen Reactions    Azithromycin      Other reaction(s): VOMITING    Metformin Other (See Comments)     Headache       Family History   Problem Relation Age of Onset    Thyroid disease Mother     Diabetes Mother     Atrial fibrillation Mother     Depression Mother     Osteoarthritis Mother     Kidney disease Mother     Mental illness Mother     Vision loss Mother         Macular degeneration    Osteoarthritis Father     Hypertension Father     Asthma Father     Coronary artery disease Father     Arthritis Father     Heart disease Father     Depression Sister     Asthma Sister     Mental illness Sister        Social History     Socioeconomic History    Marital status:      Spouse name: Adeel    Number of children: 0   Occupational History    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    Examination:    Vital Signs:    Vitals:    23 0859   Weight: 111.1 kg (245 lb)   Height: 5' 10" (1.778 m)       Body mass index is 35.15 " kg/m².    Physical Examination:  General: Well-developed, well-nourished.  Neuro: Alert and oriented x 3.  Psych: Normal mood and affect.  Left Shoulder Exam:  No obvious deformity. No medial or lateral scapula winging. Forward flexion to 170 degrees and abduction to 160 degrees and external rotation 80 degrees is and internal rotation is 70 degrees. Negative empty can, Whipple, Drop Arm Test, Ballard, impingement, AC joint tenderness. Negative biceps  groove tenderness, Dejesus´s, Yergason´s, Speed test. Negative Apprehension and Relocation test. 4+/5 strength, normal skin appearance and palpable pulses distally. Sensibility normal.  Left Elbow Exam:  No obvious deformity. Range of motion is 0 to 130 degrees. Negative varus and valgus stress test. Supination and pronation to 90 degrees.  Positive tenderness to palpation over the lateral epicondyle.  Pain with resisted wrist extension.  Negative tenderness to palpation over the medial epicondyle. Negative Tinel´s test. No olecranon tenderness. 4/5 strength, normal skin appearance and palpable pulses distally. Sensibility normal.        Assessment: Status post arthroscopy of left shoulder    Left bicipital tenosynovitis            Plan:  This patient has improved dramatically.  She will continue with a maintenance exercise program.  I am going to release her back to work next week.  She will come back to see me as needed.    Itz Curtis MD personally performed the services described in this documentation, including but not limited to patient's history, physical examination, and assessment and plan of care. All medical record entries made by Annette Freedman ATC, OTC were performed at his direction and in his presence. The medical record was reviewed and is accurate and complete.          No follow-ups on file.      DISCLAIMER: This note may have been dictated using voice recognition software and may contain grammatical errors.     NOTE: Consult report sent to referring  provider via [employer] EMR.

## 2023-05-23 NOTE — LETTER
May 23, 2023       Or[facility] Clinic  [address] [facility], [address] [place] [id number]  Phone: [phone]  Fax: [phone]       Patient: Melvina Pate   YOB: 1960  Date of Visit: 05/23/2023    To Whom It May Concern:    Lawanda Pate  was at [facility] on 05/23/2023. The patient may return to work on 05/29/2023 with no restrictions. If you have any questions or concerns, or if I can be of further assistance, please do not hesitate to contact me.    Sincerely,    Itz Curtis M.D.

## 2023-06-22 PROBLEM — F32.A DEPRESSION: Status: RESOLVED | Noted: 2022-05-23 | Resolved: 2023-06-22

## 2023-06-22 PROBLEM — F41.1 GENERALIZED ANXIETY DISORDER: Status: RESOLVED | Noted: 2022-01-20 | Resolved: 2023-06-22

## 2023-06-22 PROBLEM — M17.0 PRIMARY OSTEOARTHRITIS OF BOTH KNEES: Status: ACTIVE | Noted: 2023-06-22

## 2023-09-07 ENCOUNTER — PATIENT MESSAGE (OUTPATIENT)
Dept: RESEARCH | Facility: HOSPITAL | Age: 63
End: 2023-09-07
Payer: COMMERCIAL

## 2023-09-26 ENCOUNTER — HOSPITAL ENCOUNTER (OUTPATIENT)
Dept: RADIOLOGY | Facility: CLINIC | Age: 63
Discharge: HOME | End: 2023-09-26
Attending: SPECIALIST
Payer: COMMERCIAL

## 2023-09-26 ENCOUNTER — OFFICE VISIT (OUTPATIENT)
Dept: ORTHOPEDICS | Facility: CLINIC | Age: 63
End: 2023-09-26
Payer: COMMERCIAL

## 2023-09-26 VITALS
WEIGHT: 236 LBS | DIASTOLIC BLOOD PRESSURE: 88 MMHG | SYSTOLIC BLOOD PRESSURE: 115 MMHG | HEART RATE: 80 BPM | HEIGHT: 70 IN | BODY MASS INDEX: 33.79 KG/M2

## 2023-09-26 DIAGNOSIS — M25.552 LEFT HIP PAIN: Primary | ICD-10-CM

## 2023-09-26 DIAGNOSIS — M70.62 TROCHANTERIC BURSITIS OF LEFT HIP: ICD-10-CM

## 2023-09-26 DIAGNOSIS — M25.552 LEFT HIP PAIN: ICD-10-CM

## 2023-09-26 PROCEDURE — 3074F PR MOST RECENT SYSTOLIC BLOOD PRESSURE < 130 MM HG: ICD-10-PCS | Mod: CPTII,,, | Performed by: SPECIALIST

## 2023-09-26 PROCEDURE — 1160F RVW MEDS BY RX/DR IN RCRD: CPT | Mod: CPTII,,, | Performed by: SPECIALIST

## 2023-09-26 PROCEDURE — 3008F BODY MASS INDEX DOCD: CPT | Mod: CPTII,,, | Performed by: SPECIALIST

## 2023-09-26 PROCEDURE — 3008F PR BODY MASS INDEX (BMI) DOCUMENTED: ICD-10-PCS | Mod: CPTII,,, | Performed by: SPECIALIST

## 2023-09-26 PROCEDURE — 3079F DIAST BP 80-89 MM HG: CPT | Mod: CPTII,,, | Performed by: SPECIALIST

## 2023-09-26 PROCEDURE — 99214 PR OFFICE/OUTPT VISIT, EST, LEVL IV, 30-39 MIN: ICD-10-PCS | Mod: ,,, | Performed by: SPECIALIST

## 2023-09-26 PROCEDURE — 1159F PR MEDICATION LIST DOCUMENTED IN MEDICAL RECORD: ICD-10-PCS | Mod: CPTII,,, | Performed by: SPECIALIST

## 2023-09-26 PROCEDURE — 73502 X-RAY EXAM HIP UNI 2-3 VIEWS: CPT | Mod: LT,,, | Performed by: SPECIALIST

## 2023-09-26 PROCEDURE — 73502 XR HIP WITH PELVIS WHEN PERFORMED, 2 OR 3 VIEWS LEFT: ICD-10-PCS | Mod: LT,,, | Performed by: SPECIALIST

## 2023-09-26 PROCEDURE — 3044F PR MOST RECENT HEMOGLOBIN A1C LEVEL <7.0%: ICD-10-PCS | Mod: CPTII,,, | Performed by: SPECIALIST

## 2023-09-26 PROCEDURE — 1160F PR REVIEW ALL MEDS BY PRESCRIBER/CLIN PHARMACIST DOCUMENTED: ICD-10-PCS | Mod: CPTII,,, | Performed by: SPECIALIST

## 2023-09-26 PROCEDURE — 3074F SYST BP LT 130 MM HG: CPT | Mod: CPTII,,, | Performed by: SPECIALIST

## 2023-09-26 PROCEDURE — 99214 OFFICE O/P EST MOD 30 MIN: CPT | Mod: ,,, | Performed by: SPECIALIST

## 2023-09-26 PROCEDURE — 1159F MED LIST DOCD IN RCRD: CPT | Mod: CPTII,,, | Performed by: SPECIALIST

## 2023-09-26 PROCEDURE — 3079F PR MOST RECENT DIASTOLIC BLOOD PRESSURE 80-89 MM HG: ICD-10-PCS | Mod: CPTII,,, | Performed by: SPECIALIST

## 2023-09-26 PROCEDURE — 3044F HG A1C LEVEL LT 7.0%: CPT | Mod: CPTII,,, | Performed by: SPECIALIST

## 2023-09-26 RX ORDER — DICLOFENAC SODIUM 75 MG/1
75 TABLET, DELAYED RELEASE ORAL 2 TIMES DAILY
Qty: 30 TABLET | Refills: 1 | Status: SHIPPED | OUTPATIENT
Start: 2023-09-26
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
## 2024-01-02 PROBLEM — E66.811 OBESITY (BMI 30.0-34.9): Status: ACTIVE | Noted: 2022-05-23

## 2024-01-02 PROBLEM — E66.9 OBESITY (BMI 30.0-34.9): Status: ACTIVE | Noted: 2022-05-23

## 2024-01-11 ENCOUNTER — HOSPITAL ENCOUNTER (OUTPATIENT)
Dept: RADIOLOGY | Facility: HOSPITAL | Age: 64
Discharge: HOME | End: 2024-01-11
Attending: FAMILY MEDICINE
Payer: COMMERCIAL

## 2024-01-11 DIAGNOSIS — Z12.31 BREAST CANCER SCREENING BY MAMMOGRAM: ICD-10-CM

## 2024-01-11 PROCEDURE — 77067 SCR MAMMO BI INCL CAD: CPT | Mod: 26,,, | Performed by: RADIOLOGY

## 2024-01-11 PROCEDURE — 77067 SCR MAMMO BI INCL CAD: CPT | Mod: TC

## 2024-01-11 PROCEDURE — 77063 BREAST TOMOSYNTHESIS BI: CPT | Mod: 26,,, | Performed by: RADIOLOGY

## 2025-02-05 NOTE — PROGRESS NOTES
Subjective:        PATIENT: Melvina Pate  MRN: [id number]  DATE: 2/6/2025    PCP: Ben Evans Jr., MD   Referring Provider : Ben Evans Jr., MD  [address] [place] [address]  [place] [address]     Chief Complaint: New Patient          02/06/2025  HPI  He was referred by Dr. Ben Evans for an elevated hemoglobin on erythrocytosis  This patient was hematocrit in 2020 was 45.5.  In 2022.  He was noted to be 48.2.  Then increased to 49.4.  With a hemoglobin of 16.6.  In January of 2024 the patient was hematocrit was noted to be 48.3  Patient was noted to have a normal total bilirubin normal transaminases, normal creatinine.  And a erythropoietin level was drawn on January 11th which was in the normal range at 7.4.  And a JAK2 mutation was negative.  For V 617 F..  A repeat CBC on January 10, 2025 showed the patient was still has a hemoglobin of 16.5 hematocrit 48.1.  A normal white count normal differential the patient was referred to us for persistent erythrocytosis and further evaluation.      This patient denies any sleeping issues.  In fact she had a sleep study done recently that was negative.    She does smoke.    Her  is a pipe smoker.  She does not have a carbon monoxide detector in her house.    She denies any erythropoietin.  She denies any history of stroke or clot.  She denies any itching after hot shower.  No Raynaud's type symptoms  She denies any early satiety.  However she states she has been on some medications.  She was not on Jardiance      She denies any history of supplements.  Including testosterone.      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  Past Surgical History:   Procedure Laterality Date    ARTHROSCOPIC DEBRIDEMENT OF SHOULDER Left 11/30/2022    Procedure: DEBRIDEMENT, SHOULDER, ARTHROSCOPIC;   Surgeon: Itz Curtis MD;  Location: [facility] OR;  Service: Orthopedics;  Laterality: Left;    ARTHROSCOPY OF SHOULDER WITH REMOVAL OF DISTAL CLAVICLE Left 11/30/2022    Procedure: ARTHROSCOPY, SHOULDER, WITH DISTAL CLAVICLE EXCISION;  Surgeon: Itz Curtis MD;  Location: [facility] OR;  Service: Orthopedics;  Laterality: Left;    ARTHROSCOPY,SHOULDER,WITH BICEPS TENODESIS Left 11/30/2022    Procedure: ARTHROSCOPY,SHOULDER,WITH BICEPS TENODESIS;  Surgeon: Itz Curtis MD;  Location: [facility] OR;  Service: Orthopedics;  Laterality: Left;    COLONOSCOPY  05/15/2013    Extraction of wisdom tooth      HYSTERECTOMY  1998    Repair Upper Lip, External Approach (05/18/2018)      Termination of ectopic pregnancy      x 3    TONSILLECTOMY        .  Family History   Problem Relation Name Age of Onset    Thyroid disease Mother Shantal Pate     Diabetes Mother Shantal Pate     Atrial fibrillation Mother Shantal Pate     Depression Mother Shantal Pate     Osteoarthritis Mother Shantal Pate     Kidney disease Mother Shantal Pate     Mental illness Mother Shantal Pate     Vision loss Mother Shantal Paet         Macular degeneration    Osteoarthritis Father Valerie Pate     Hypertension Father Valerie Pate     Asthma Father Valerie Pate     Coronary artery disease Father Vaelrie Pate     Arthritis Father Valerie Pate     Heart disease Father Valerie Pate     Depression Sister Sintia Pate     Asthma Sister Sintia Rio     Mental illness Sister Sintia Pate       Social History     Socioeconomic History    Marital status:      Spouse name: Adeel    Number of children: 0   Occupational History    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   Tobacco Use    Smoking status: Former     Current packs/day: 0.00     Average packs/day: 0.5 packs/day for 38.9 years (19.5 ttl pk-yrs)     Types: Cigarettes     Start date: 1/1/1983     Quit date: 12/1/2021     Years since quitting: 3.1    Smokeless tobacco: Never   Substance and  Sexual Activity    Alcohol use: Yes     Alcohol/week: 1.0 standard drink of alcohol     Types: 1 Shots of liquor per week     Comment: 3 times per year    Drug use: Never    Sexual activity: Not Currently     Partners: Male     Birth control/protection: Post-menopausal     Social Drivers of Health     Financial Resource Strain: Low Risk  (6/21/2024)    Overall Financial Resource Strain (CARDIA)     Difficulty of Paying Living Expenses: Not hard at all   Food Insecurity: No Food Insecurity (6/21/2024)    Hunger Vital Sign     Worried About Running Out of Food in the Last Year: Never true     Ran Out of Food in the Last Year: Never true   Physical Activity: Insufficiently Active (6/21/2024)    Exercise Vital Sign     Days of Exercise per Week: 5 days     Minutes of Exercise per Session: 20 min   Stress: No Stress Concern Present (6/21/2024)    Citizen of [place] [employer] Occupational Health - Occupational Stress Questionnaire     Feeling of Stress : Not at all   Housing Stability: Unknown (6/21/2024)    Housing Stability Vital Sign     Unable to Pay for Housing in the Last Year: No      .  Review of patient's allergies indicates:   Allergen Reactions    Azithromycin      Other reaction(s): VOMITING    Metformin Other (See Comments)     Headache        Current Outpatient Medications:     hydroCHLOROthiazide (HYDRODIURIL) 25 MG tablet, Take 1 tablet (25 mg total) by mouth once daily., Disp: 30 tablet, Rfl: 5    tirzepatide (MOUNJARO) 5 mg/0.5 mL PnIj, Inject 5 mg into the skin every 7 days., Disp: 2 mL, Rfl: 5   Review of Systems   Constitutional:  Negative for appetite change and unexpected weight change.   HENT:  Negative for mouth sores.    Eyes:  Negative for visual disturbance.   Respiratory:  Negative for cough and shortness of breath.    Cardiovascular:  Negative for chest pain.   Gastrointestinal:  Negative for abdominal pain and diarrhea.   Genitourinary:  Negative for frequency.   Musculoskeletal:  Negative for back  pain.   Skin:  Negative for rash.   Neurological:  Negative for headaches.   Hematological:  Negative for adenopathy.   Psychiatric/Behavioral:  The patient is not nervous/anxious.             Objective:     Vitals:    02/06/25 1502   BP: 113/76   Pulse: 93   Resp: 18   Temp: 97.9 °F (36.6 °C)         Physical Exam    ECOG SCORE            .Lab Review:  In EPIC  Lab Review:  CBC:   Recent Labs     02/06/25  1545 01/10/25  0845   WBC 10.48 8.40   RBC 5.62* 5.42*   HGB 17.2* 16.5*   HCT 49.3* 48.1*    211     CMP:   Recent Labs     01/10/25  0845      K 5.8*      CO2 30   BUN 21*   CREATININE 0.93   CALCIUM 10.4*   LABPROT 7.1   ALBUMIN 4.9   BILITOT 0.8   ALKPHOS 92   AST 18   ALT 18         Assessment/Plan:   Erythrocytosis: Most likely smoker's erythrocytosis given her workup             Erythrocytosis: Hgb >16.5 g/dL/Hct >49% in euvolemic men or Hgb >16 g/dL/Hct >48% in euvolemic women. -  Erythrocytosis can be caused by various factors, including:   Primary erythrocytosis (polycythemia vera): A bone marrow disorder that leads to excessive production of red blood cells.   Secondary erythrocytosis: Caused by underlying conditions that stimulate the production of red blood cells, such as:   Low oxygen levels (e.g., high altitudes, smoking)   Kidney disease   Certain medications (e.g., erythropoietin)   Drug causes: ) , SGLT2 inhibitors (Canagliflozin (Invokana), Dapagliflozin (Farxiga), Empagliflozin (Jardiance), Ertugliflozin (Steglatro), Tofogliflozin (Apleway, Deberza), and Bexagliflozin (Brenzavvy )  Hormonal imbalances (e.g., testosterone)   Familial erythrocytosis: An inherited condition that affects the production of red blood cells.       In the workup of erythrocytosis, pruritus, erythromelalgia, vasomotor symptoms of hyperviscosity.  Early satiety splenomegaly thrombosis, associated leukocytosis or thrombocytosis or family history or findings suggestive of an MPN.  In the abs         -with her elevated BUN                       ? euvolemic            This patient was initial workup should normal  serum EPO and negative  JAK2 mutation were negative     Secondary causes of  this can be  evaluated   I encouraged her to stop smoking  If clinical finding above suggetive of MPN then  MPL, CALR, BCR-ABL can also be done or bone marrow can also be discussed       REC        repeat serum EPO level,  CBC        Check UA      Increase water      F/u in 6 week        Follow up in about 6 weeks (around 3/20/2025) for With ME  repeat CBC.   Orders Placed This Encounter    CBC Auto Differential    Urinalysis    Erythropoietin    CBC with Differential      Naresh Arriola MD    Total time 45 minutes, 30 minutes spent with the patient today, 15 minutes spent in review of records and documentation

## 2025-02-06 ENCOUNTER — OFFICE VISIT (OUTPATIENT)
Dept: HEMATOLOGY/ONCOLOGY | Facility: CLINIC | Age: 65
End: 2025-02-06
Payer: COMMERCIAL

## 2025-02-06 VITALS
HEIGHT: 70 IN | HEART RATE: 93 BPM | TEMPERATURE: 98 F | SYSTOLIC BLOOD PRESSURE: 113 MMHG | WEIGHT: 234.81 LBS | BODY MASS INDEX: 33.62 KG/M2 | OXYGEN SATURATION: 98 % | DIASTOLIC BLOOD PRESSURE: 76 MMHG | RESPIRATION RATE: 18 BRPM

## 2025-02-06 DIAGNOSIS — D75.1 ERYTHROCYTOSIS: Primary | ICD-10-CM

## 2025-02-06 LAB
BACTERIA #/AREA URNS AUTO: ABNORMAL /HPF
BASOPHILS # BLD AUTO: 0.03 X10(3)/MCL
BASOPHILS NFR BLD AUTO: 0.3 %
BILIRUB UR QL STRIP.AUTO: NEGATIVE
CLARITY UR: CLEAR
COLOR UR AUTO: YELLOW
EOSINOPHIL # BLD AUTO: 0.11 X10(3)/MCL (ref 0–0.9)
EOSINOPHIL NFR BLD AUTO: 1 %
ERYTHROCYTE [DISTWIDTH] IN BLOOD BY AUTOMATED COUNT: 11.9 % (ref 11.5–17)
GLUCOSE UR QL STRIP: NORMAL
HCT VFR BLD AUTO: 49.3 % (ref 37–47)
HGB BLD-MCNC: 17.2 G/DL (ref 12–16)
HGB UR QL STRIP: NEGATIVE
HYALINE CASTS #/AREA URNS LPF: ABNORMAL /LPF
IMM GRANULOCYTES # BLD AUTO: 0.04 X10(3)/MCL (ref 0–0.04)
IMM GRANULOCYTES NFR BLD AUTO: 0.4 %
KETONES UR QL STRIP: NEGATIVE
LEUKOCYTE ESTERASE UR QL STRIP: NEGATIVE
LYMPHOCYTES # BLD AUTO: 3.34 X10(3)/MCL (ref 0.6–4.6)
LYMPHOCYTES NFR BLD AUTO: 31.9 %
MCH RBC QN AUTO: 30.6 PG (ref 27–31)
MCHC RBC AUTO-ENTMCNC: 34.9 G/DL (ref 33–36)
MCV RBC AUTO: 87.7 FL (ref 80–94)
MONOCYTES # BLD AUTO: 0.65 X10(3)/MCL (ref 0.1–1.3)
MONOCYTES NFR BLD AUTO: 6.2 %
MUCOUS THREADS URNS QL MICRO: ABNORMAL /LPF
NEUTROPHILS # BLD AUTO: 6.31 X10(3)/MCL (ref 2.1–9.2)
NEUTROPHILS NFR BLD AUTO: 60.2 %
NITRITE UR QL STRIP: NEGATIVE
PH UR STRIP: 5 [PH]
PLATELET # BLD AUTO: 267 X10(3)/MCL (ref 130–400)
PMV BLD AUTO: 11.3 FL (ref 7.4–10.4)
PROT UR QL STRIP: ABNORMAL
RBC # BLD AUTO: 5.62 X10(6)/MCL (ref 4.2–5.4)
RBC #/AREA URNS AUTO: ABNORMAL /HPF
SP GR UR STRIP.AUTO: 1.03 (ref 1–1.03)
SQUAMOUS #/AREA URNS LPF: ABNORMAL /HPF
UROBILINOGEN UR STRIP-ACNC: NORMAL
WBC # BLD AUTO: 10.48 X10(3)/MCL (ref 4.5–11.5)
WBC #/AREA URNS AUTO: ABNORMAL /HPF

## 2025-02-06 PROCEDURE — 82668 ASSAY OF ERYTHROPOIETIN: CPT | Performed by: INTERNAL MEDICINE

## 2025-02-06 PROCEDURE — 81001 URINALYSIS AUTO W/SCOPE: CPT | Performed by: INTERNAL MEDICINE

## 2025-02-06 PROCEDURE — 36415 COLL VENOUS BLD VENIPUNCTURE: CPT | Performed by: INTERNAL MEDICINE

## 2025-02-06 PROCEDURE — 85025 COMPLETE CBC W/AUTO DIFF WBC: CPT | Performed by: INTERNAL MEDICINE

## 2025-02-06 PROCEDURE — 1160F RVW MEDS BY RX/DR IN RCRD: CPT | Mod: CPTII,S$GLB,, | Performed by: INTERNAL MEDICINE

## 2025-02-06 PROCEDURE — 3074F SYST BP LT 130 MM HG: CPT | Mod: CPTII,S$GLB,, | Performed by: INTERNAL MEDICINE

## 2025-02-06 PROCEDURE — 1159F MED LIST DOCD IN RCRD: CPT | Mod: CPTII,S$GLB,, | Performed by: INTERNAL MEDICINE

## 2025-02-06 PROCEDURE — 3008F BODY MASS INDEX DOCD: CPT | Mod: CPTII,S$GLB,, | Performed by: INTERNAL MEDICINE

## 2025-02-06 PROCEDURE — 3066F NEPHROPATHY DOC TX: CPT | Mod: CPTII,S$GLB,, | Performed by: INTERNAL MEDICINE

## 2025-02-06 PROCEDURE — 99999 PR PBB SHADOW E&M-EST. PATIENT-LVL IV: CPT | Mod: PBBFAC,,, | Performed by: INTERNAL MEDICINE

## 2025-02-06 PROCEDURE — 3052F HG A1C>EQUAL 8.0%<EQUAL 9.0%: CPT | Mod: CPTII,S$GLB,, | Performed by: INTERNAL MEDICINE

## 2025-02-06 PROCEDURE — 3061F NEG MICROALBUMINURIA REV: CPT | Mod: CPTII,S$GLB,, | Performed by: INTERNAL MEDICINE

## 2025-02-06 PROCEDURE — 99204 OFFICE O/P NEW MOD 45 MIN: CPT | Mod: S$GLB,,, | Performed by: INTERNAL MEDICINE

## 2025-02-06 PROCEDURE — 3078F DIAST BP <80 MM HG: CPT | Mod: CPTII,S$GLB,, | Performed by: INTERNAL MEDICINE

## 2025-02-07 DIAGNOSIS — E87.5 HYPERKALEMIA: Primary | ICD-10-CM

## 2025-02-07 DIAGNOSIS — D75.1 ERYTHROCYTOSIS: ICD-10-CM

## 2025-02-07 LAB — EPO SERPL-ACNC: 6.1 MIU/ML (ref 2.6–18.5)

## 2025-02-10 ENCOUNTER — HOSPITAL ENCOUNTER (OUTPATIENT)
Dept: RADIOLOGY | Facility: HOSPITAL | Age: 65
Discharge: HOME | End: 2025-02-10
Attending: FAMILY MEDICINE
Payer: COMMERCIAL

## 2025-02-10 DIAGNOSIS — Z12.31 BREAST CANCER SCREENING BY MAMMOGRAM: ICD-10-CM

## 2025-02-10 DIAGNOSIS — D75.1 ERYTHROCYTOSIS: Primary | ICD-10-CM

## 2025-02-10 PROCEDURE — 77063 BREAST TOMOSYNTHESIS BI: CPT | Mod: 26,,, | Performed by: RADIOLOGY

## 2025-02-10 PROCEDURE — 77067 SCR MAMMO BI INCL CAD: CPT | Mod: TC

## 2025-02-10 PROCEDURE — 77067 SCR MAMMO BI INCL CAD: CPT | Mod: 26,,, | Performed by: RADIOLOGY

## 2025-02-11 ENCOUNTER — TELEPHONE (OUTPATIENT)
Dept: HEMATOLOGY/ONCOLOGY | Facility: CLINIC | Age: 65
End: 2025-02-11
Payer: COMMERCIAL

## 2025-02-11 DIAGNOSIS — D75.1 ERYTHROCYTOSIS: ICD-10-CM

## 2025-02-11 NOTE — TELEPHONE ENCOUNTER
Spoke to patient, advised of below and booked lab appointment.        ----- Message from Nurse Lee sent at 2/11/2025  8:07 AM CST -----    ----- Message -----  From: Naresh Arriola MD  Sent: 2/10/2025   5:54 PM CST  To: Flako MIGUEL Staff    Her erythropoietin level is 6.1.  In the setting of a hemoglobin of 17.        I would like her to come in and get a  BCR-ABL, MPL, Jed exon 12 testing done in the blood

## 2025-02-13 ENCOUNTER — LAB VISIT (OUTPATIENT)
Dept: LAB | Facility: HOSPITAL | Age: 65
End: 2025-02-13
Attending: INTERNAL MEDICINE
Payer: COMMERCIAL

## 2025-02-13 DIAGNOSIS — D75.1 ERYTHROCYTOSIS: ICD-10-CM

## 2025-02-13 PROCEDURE — 36415 COLL VENOUS BLD VENIPUNCTURE: CPT

## 2025-02-13 PROCEDURE — 81208 BCR/ABL1 GENE OTHER BP: CPT

## 2025-02-13 PROCEDURE — 81207 BCR/ABL1 GENE MINOR BP: CPT

## 2025-02-13 PROCEDURE — 81206 BCR/ABL1 GENE MAJOR BP: CPT

## 2025-02-13 PROCEDURE — 0027U JAK2 GENE TRGT SEQ ALYS: CPT

## 2025-02-14 ENCOUNTER — PATIENT MESSAGE (OUTPATIENT)
Dept: HEMATOLOGY/ONCOLOGY | Facility: CLINIC | Age: 65
End: 2025-02-14
Payer: COMMERCIAL

## 2025-02-18 LAB — MAYO GENERIC ORDERABLE RESULT: NORMAL

## 2025-02-20 LAB
JAK2 EXON 12 MUT ANL BLD/T: NORMAL
PATH REPORT.FINAL DX SPEC: NORMAL

## 2025-03-05 ENCOUNTER — RESULTS FOLLOW-UP (OUTPATIENT)
Dept: HEMATOLOGY/ONCOLOGY | Facility: CLINIC | Age: 65
End: 2025-03-05

## 2025-03-26 NOTE — PROGRESS NOTES
Subjective:        PATIENT: Melvina Pate  MRN: [id number]  DATE: 3/27/2025    PCP: Ben Evans Jr., MD   Referring Provider : No referring provider defined for this encounter.     Chief Complaint: Follow-up (Patient states she has no complaints or concerns.)          03/27/2025  Patient presents for follow up for Erythrocytosis.  She is in good spirits; 2/11/25 she quit smoking.   Patient is painting as a hobby and roller skating. She denies abnormal bleeding, SOB and s/s of infection.  Lost 2 lbs; on Mounjaro.  She drinks a lot of water.  Reviewed lab results with patient.  Discussed distress screening; she stated she had a bad day at work and her  was in car accident that day so she was upset but has no distress to address.  Reviewed lab results and discussed future appointments.  Appointment with PCP 7/10/25 - labs with PCP.    HPI  He was referred by Dr. Ben Evans for an elevated hemoglobin on erythrocytosis  This patient was hematocrit in 2020 was 45.5.  In 2022.  He was noted to be 48.2.  Then increased to 49.4.  With a hemoglobin of 16.6.  In January of 2024 the patient was hematocrit was noted to be 48.3  Patient was noted to have a normal total bilirubin normal transaminases, normal creatinine.  And a erythropoietin level was drawn on January 11th which was in the normal range at 7.4.  And a JAK2 mutation was negative.  For V 617 F..  A repeat CBC on January 10, 2025 showed the patient was still has a hemoglobin of 16.5 hematocrit 48.1.  A normal white count normal differential the patient was referred to us for persistent erythrocytosis and further evaluation.      This patient denies any sleeping issues.  In fact she had a sleep study done recently that was negative.    She does smoke.    Her  is a pipe smoker.  She does not have a carbon monoxide detector in her house.    She denies any erythropoietin.  She denies any history of stroke or clot.  She denies any itching after  hot shower.  No Raynaud's type symptoms  She denies any early satiety.  However she states she has been on some medications.  She was not on Jardiance      She denies any history of supplements.  Including testosterone.      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  Past Surgical History:   Procedure Laterality Date    ARTHROSCOPIC DEBRIDEMENT OF SHOULDER Left 11/30/2022    Procedure: DEBRIDEMENT, SHOULDER, ARTHROSCOPIC;  Surgeon: Itz Curtis MD;  Location: [facility] OR;  Service: Orthopedics;  Laterality: Left;    ARTHROSCOPY OF SHOULDER WITH REMOVAL OF DISTAL CLAVICLE Left 11/30/2022    Procedure: ARTHROSCOPY, SHOULDER, WITH DISTAL CLAVICLE EXCISION;  Surgeon: Itz Curtis MD;  Location: [facility] OR;  Service: Orthopedics;  Laterality: Left;    ARTHROSCOPY,SHOULDER,WITH BICEPS TENODESIS Left 11/30/2022    Procedure: ARTHROSCOPY,SHOULDER,WITH BICEPS TENODESIS;  Surgeon: Itz Curtis MD;  Location: [facility] OR;  Service: Orthopedics;  Laterality: Left;    COLONOSCOPY  05/15/2013    Extraction of wisdom tooth      HYSTERECTOMY  1998    Repair Upper Lip, External Approach (05/18/2018)      Termination of ectopic pregnancy      x 3    TONSILLECTOMY        .  Family History   Problem Relation Name Age of Onset    Thyroid disease Mother Shantal Pate     Diabetes Mother Shantal Pate     Atrial fibrillation Mother Shantal Pate     Depression Mother Shantal Pate     Osteoarthritis Mother Shantal Pate     Kidney disease Mother Shantal Pate     Mental illness Mother Shantal Pate     Vision loss Mother Shantal Pate         Macular degeneration    Osteoarthritis Father Valerie Pate     Hypertension Father Valerie Pate     Asthma Father Valerie Pate     Coronary artery disease Father Valerie Pate     Arthritis Father Valerie Pate      Heart disease Father Valerie Pate     Depression Sister Sintia Pate     Asthma Sister Sintia Pate     Mental illness Sister Sintia Pate       Social History     Socioeconomic History    Marital status:      Spouse name: Adeel    Number of children: 0   Occupational History    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   Tobacco Use    Smoking status: Former     Current packs/day: 0.00     Average packs/day: 0.5 packs/day for 38.9 years (19.5 ttl pk-yrs)     Types: Cigarettes     Start date: 1/1/1983     Quit date: 12/1/2021     Years since quitting: 3.3    Smokeless tobacco: Never   Substance and Sexual Activity    Alcohol use: Yes     Alcohol/week: 1.0 standard drink of alcohol     Types: 1 Shots of liquor per week     Comment: 3 times per year    Drug use: Never    Sexual activity: Not Currently     Partners: Male     Birth control/protection: Post-menopausal     Social Drivers of Health     Financial Resource Strain: Low Risk  (6/21/2024)    Overall Financial Resource Strain (CARDIA)     Difficulty of Paying Living Expenses: Not hard at all   Food Insecurity: No Food Insecurity (6/21/2024)    Hunger Vital Sign     Worried About Running Out of Food in the Last Year: Never true     Ran Out of Food in the Last Year: Never true   Physical Activity: Insufficiently Active (6/21/2024)    Exercise Vital Sign     Days of Exercise per Week: 5 days     Minutes of Exercise per Session: 20 min   Stress: No Stress Concern Present (6/21/2024)    [employer] [place] [employer] - Occupational Stress Questionnaire     Feeling of Stress : Not at all   Housing Stability: Unknown (6/21/2024)    Housing Stability Vital Sign     Unable to Pay for Housing in the Last Year: No      .  Review of patient's allergies indicates:   Allergen Reactions    Azithromycin      Other reaction(s): VOMITING    Metformin Other (See Comments)     Headache        Current Outpatient Medications:     hydroCHLOROthiazide (HYDRODIURIL) 25 MG  tablet, Take 1 tablet (25 mg total) by mouth once daily., Disp: 30 tablet, Rfl: 5    tirzepatide (MOUNJARO) 5 mg/0.5 mL PnIj, Inject 5 mg into the skin every 7 days., Disp: 2 mL, Rfl: 5   Review of Systems   Constitutional:  Negative for appetite change and unexpected weight change.   HENT:  Negative for mouth sores.    Eyes:  Negative for visual disturbance.   Respiratory:  Negative for cough and shortness of breath.    Cardiovascular:  Negative for chest pain.   Gastrointestinal:  Negative for abdominal pain and diarrhea.   Genitourinary:  Negative for frequency.   Musculoskeletal:  Negative for back pain.   Skin:  Negative for rash.   Neurological:  Negative for headaches.   Hematological:  Negative for adenopathy.   Psychiatric/Behavioral:  The patient is not nervous/anxious.             Objective:     Vitals:    03/27/25 1338   BP: 103/73   Pulse: 103   Resp: 18   Temp: 97.4 °F (36.3 °C)           Physical Exam    ECOG SCORE            Assessment/Plan:   Erythrocytosis: Most likely smoker's erythrocytosis given her workup             Erythrocytosis: Hct >48% in euvolemic women. -         Erythrocytosis can be caused by various factors, including:   Primary erythrocytosis (polycythemia vera): A bone marrow disorder that leads to excessive production of red blood cells.   Secondary erythrocytosis: Caused by underlying conditions that stimulate the production of red blood cells, such as:   Low oxygen levels (e.g., high altitudes, smoking)   Kidney disease   Certain medications (e.g., erythropoietin)   Drug causes: ) , SGLT2 inhibitors (Canagliflozin (Invokana), Dapagliflozin (Farxiga), Empagliflozin (Jardiance), Ertugliflozin (Steglatro), Tofogliflozin (Apleway, Deberza), and Bexagliflozin (Brenzavvy )  Hormonal imbalances (e.g., testosterone)   Familial erythrocytosis: An inherited condition that affects the production of red blood cells.       In the workup of erythrocytosis, pruritus, erythromelalgia, vasomotor  symptoms of hyperviscosity.  Early satiety splenomegaly thrombosis, associated leukocytosis or thrombocytosis or family history or findings suggestive of an MPN.            This patient was initial workup should  low normal  serum EPO and negative  JAK2 mutation were negative    MPL, CALR, BCR-ABL  also negative      Discussed distress screen    REC   Telemed with cbc from PCP  Avoid tobacco   If HCT > 48 and bun normal    Start ASA     Will consider ABG with carboxyhemoglobin and and bone marrow to r/o MPD       Follow up in about 16 weeks (around 7/17/2025) for Telemed with Flako.         IRosa LPN, acted solely as a scribe for and in the presence of, Dr. Naresh Arriola who performed the service.    Naresh Arriola MD

## 2025-03-27 ENCOUNTER — LAB VISIT (OUTPATIENT)
Dept: LAB | Facility: HOSPITAL | Age: 65
End: 2025-03-27
Attending: INTERNAL MEDICINE
Payer: COMMERCIAL

## 2025-03-27 ENCOUNTER — OFFICE VISIT (OUTPATIENT)
Dept: HEMATOLOGY/ONCOLOGY | Facility: CLINIC | Age: 65
End: 2025-03-27
Payer: COMMERCIAL

## 2025-03-27 VITALS
HEART RATE: 103 BPM | WEIGHT: 220.31 LBS | SYSTOLIC BLOOD PRESSURE: 103 MMHG | BODY MASS INDEX: 31.61 KG/M2 | RESPIRATION RATE: 18 BRPM | TEMPERATURE: 97 F | OXYGEN SATURATION: 99 % | DIASTOLIC BLOOD PRESSURE: 73 MMHG

## 2025-03-27 DIAGNOSIS — D75.1 ERYTHROCYTOSIS: ICD-10-CM

## 2025-03-27 DIAGNOSIS — D75.1 ERYTHROCYTOSIS: Primary | ICD-10-CM

## 2025-03-27 LAB
BASOPHILS # BLD AUTO: 0.03 X10(3)/MCL
BASOPHILS NFR BLD AUTO: 0.3 %
EOSINOPHIL # BLD AUTO: 0.07 X10(3)/MCL (ref 0–0.9)
EOSINOPHIL NFR BLD AUTO: 0.7 %
ERYTHROCYTE [DISTWIDTH] IN BLOOD BY AUTOMATED COUNT: 11.9 % (ref 11.5–17)
HCT VFR BLD AUTO: 46.1 % (ref 37–47)
HGB BLD-MCNC: 16.4 G/DL (ref 12–16)
IMM GRANULOCYTES # BLD AUTO: 0.02 X10(3)/MCL (ref 0–0.04)
IMM GRANULOCYTES NFR BLD AUTO: 0.2 %
LYMPHOCYTES # BLD AUTO: 2.38 X10(3)/MCL (ref 0.6–4.6)
LYMPHOCYTES NFR BLD AUTO: 23 %
MCH RBC QN AUTO: 31.1 PG (ref 27–31)
MCHC RBC AUTO-ENTMCNC: 35.6 G/DL (ref 33–36)
MCV RBC AUTO: 87.3 FL (ref 80–94)
MONOCYTES # BLD AUTO: 0.65 X10(3)/MCL (ref 0.1–1.3)
MONOCYTES NFR BLD AUTO: 6.3 %
NEUTROPHILS # BLD AUTO: 7.21 X10(3)/MCL (ref 2.1–9.2)
NEUTROPHILS NFR BLD AUTO: 69.5 %
PLATELET # BLD AUTO: 256 X10(3)/MCL (ref 130–400)
PMV BLD AUTO: 11 FL (ref 7.4–10.4)
RBC # BLD AUTO: 5.28 X10(6)/MCL (ref 4.2–5.4)
WBC # BLD AUTO: 10.36 X10(3)/MCL (ref 4.5–11.5)

## 2025-03-27 PROCEDURE — 99213 OFFICE O/P EST LOW 20 MIN: CPT | Mod: S$GLB,,, | Performed by: INTERNAL MEDICINE

## 2025-03-27 PROCEDURE — 3008F BODY MASS INDEX DOCD: CPT | Mod: CPTII,S$GLB,, | Performed by: INTERNAL MEDICINE

## 2025-03-27 PROCEDURE — 3066F NEPHROPATHY DOC TX: CPT | Mod: CPTII,S$GLB,, | Performed by: INTERNAL MEDICINE

## 2025-03-27 PROCEDURE — 3074F SYST BP LT 130 MM HG: CPT | Mod: CPTII,S$GLB,, | Performed by: INTERNAL MEDICINE

## 2025-03-27 PROCEDURE — 3061F NEG MICROALBUMINURIA REV: CPT | Mod: CPTII,S$GLB,, | Performed by: INTERNAL MEDICINE

## 2025-03-27 PROCEDURE — 36415 COLL VENOUS BLD VENIPUNCTURE: CPT

## 2025-03-27 PROCEDURE — 85025 COMPLETE CBC W/AUTO DIFF WBC: CPT

## 2025-03-27 PROCEDURE — 3052F HG A1C>EQUAL 8.0%<EQUAL 9.0%: CPT | Mod: CPTII,S$GLB,, | Performed by: INTERNAL MEDICINE

## 2025-03-27 PROCEDURE — 1160F RVW MEDS BY RX/DR IN RCRD: CPT | Mod: CPTII,S$GLB,, | Performed by: INTERNAL MEDICINE

## 2025-03-27 PROCEDURE — 1159F MED LIST DOCD IN RCRD: CPT | Mod: CPTII,S$GLB,, | Performed by: INTERNAL MEDICINE

## 2025-03-27 PROCEDURE — 3078F DIAST BP <80 MM HG: CPT | Mod: CPTII,S$GLB,, | Performed by: INTERNAL MEDICINE

## 2025-03-27 PROCEDURE — 99999 PR PBB SHADOW E&M-EST. PATIENT-LVL III: CPT | Mod: PBBFAC,,, | Performed by: INTERNAL MEDICINE

## 2025-07-10 PROBLEM — E66.811 OBESITY (BMI 30.0-34.9): Status: RESOLVED | Noted: 2022-05-23 | Resolved: 2025-07-10

## 2025-07-10 NOTE — PROGRESS NOTES
Subjective:        PATIENT: Melvina Pate  MRN: [id number]  DATE: 7/17/2025    PCP: Ben Evans Jr., MD     Chief Complaint: 16wk f/u          07/17/2025  Patient her for telemed f/u  Labs with PCP on 7/10 reviewed  Still not smoking    HPI  He was referred by Dr. Ben Evans for an elevated hemoglobin on erythrocytosis  This patient was hematocrit in 2020 was 45.5.  In 2022.  He was noted to be 48.2.  Then increased to 49.4.  With a hemoglobin of 16.6.  In January of 2024 the patient was hematocrit was noted to be 48.3  Patient was noted to have a normal total bilirubin normal transaminases, normal creatinine.  And a erythropoietin level was drawn on January 11th which was in the normal range at 7.4.  And a JAK2 mutation was negative.  For V 617 F..  A repeat CBC on January 10, 2025 showed the patient was still has a hemoglobin of 16.5 hematocrit 48.1.  A normal white count normal differential the patient was referred to us for persistent erythrocytosis and further evaluation.      This patient denies any sleeping issues.  In fact she had a sleep study done recently that was negative.    She does smoke.    Her  is a pipe smoker.  She does not have a carbon monoxide detector in her house.    She denies any erythropoietin.  She denies any history of stroke or clot.  She denies any itching after hot shower.  No Raynaud's type symptoms  She denies any early satiety.  However she states she has been on some medications.  She was not on Jardiance      She denies any history of supplements.  Including testosterone.    Negative. No BCR/ABL1 mRNA transcripts were detected.       . No deletion or insertion was detected in CALR,   exon 9.       Negative. No mutation was detected in MPL, exon 10.         Negative for JAK2 V617F.       No pathogenic genetic alterations were detected   in JAK2, exons 12-15.         Past Medical History:   Diagnosis Date    Depression     DJD (degenerative joint disease) of  right wrist     SURAJ (generalized anxiety disorder)     Hypertension     Type 2 diabetes mellitus without complications       .  Past Surgical History:   Procedure Laterality Date    ARTHROSCOPIC DEBRIDEMENT OF SHOULDER Left 11/30/2022    Procedure: DEBRIDEMENT, SHOULDER, ARTHROSCOPIC;  Surgeon: Itz Curtis MD;  Location: [facility] OR;  Service: Orthopedics;  Laterality: Left;    ARTHROSCOPY OF SHOULDER WITH REMOVAL OF DISTAL CLAVICLE Left 11/30/2022    Procedure: ARTHROSCOPY, SHOULDER, WITH DISTAL CLAVICLE EXCISION;  Surgeon: Itz Curtis MD;  Location: [facility] OR;  Service: Orthopedics;  Laterality: Left;    ARTHROSCOPY,SHOULDER,WITH BICEPS TENODESIS Left 11/30/2022    Procedure: ARTHROSCOPY,SHOULDER,WITH BICEPS TENODESIS;  Surgeon: Itz Curtis MD;  Location: [facility] OR;  Service: Orthopedics;  Laterality: Left;    COLONOSCOPY  05/15/2013    Extraction of wisdom tooth      HYSTERECTOMY  1998    Repair Upper Lip, External Approach (05/18/2018)      Termination of ectopic pregnancy      x 3    TONSILLECTOMY        .  Family History   Problem Relation Name Age of Onset    Thyroid disease Mother Shantal Pate     Diabetes Mother Shantal Pate     Atrial fibrillation Mother Shantal Pate     Depression Mother Shantal Pate     Osteoarthritis Mother Shantal Pate     Kidney disease Mother Shantal Pate     Mental illness Mother Shantal Pate     Vision loss Mother Shantal Pate         Macular degeneration    Osteoarthritis Father Valerie Pate     Hypertension Father Valerie Pate     Asthma Father Valerie Pate     Coronary artery disease Father Valerie Pate     Arthritis Father Valerie Pate     Heart disease Father Valerie Pate     Depression Sister Sintia Rio     Asthma Sister Sintia Rio     Mental illness Sister Sintia Pate       Social History     Socioeconomic History    Marital status:      Spouse name: Adeel    Number of children: 0   Occupational History    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  [employer]   Tobacco Use    Smoking status: Former     Current packs/day: 0.00     Average packs/day: 0.5 packs/day for 38.9 years (19.5 ttl pk-yrs)     Types: Cigarettes     Start date: 1/1/1983     Quit date: 12/1/2021     Years since quitting: 3.6    Smokeless tobacco: Never   Substance and Sexual Activity    Alcohol use: Yes     Alcohol/week: 1.0 standard drink of alcohol     Types: 1 Shots of liquor per week     Comment: 3 times per year    Drug use: Never    Sexual activity: Not Currently     Partners: Male     Birth control/protection: Post-menopausal     Social Drivers of Health     Financial Resource Strain: Low Risk  (6/21/2024)    Overall Financial Resource Strain (CARDIA)     Difficulty of Paying Living Expenses: Not hard at all   Food Insecurity: No Food Insecurity (6/21/2024)    Hunger Vital Sign     Worried About Running Out of Food in the Last Year: Never true     Ran Out of Food in the Last Year: Never true   Physical Activity: Insufficiently Active (6/21/2024)    Exercise Vital Sign     Days of Exercise per Week: 5 days     Minutes of Exercise per Session: 20 min   Stress: No Stress Concern Present (6/21/2024)    [employer] [place] [employer] - Occupational Stress Questionnaire     Feeling of Stress : Not at all   Housing Stability: Unknown (6/21/2024)    Housing Stability Vital Sign     Unable to Pay for Housing in the Last Year: No      .  Review of patient's allergies indicates:   Allergen Reactions    Azithromycin      Other reaction(s): VOMITING    Metformin Other (See Comments)     Headache        Current Outpatient Medications:     hydroCHLOROthiazide (HYDRODIURIL) 25 MG tablet, Take 1 tablet (25 mg total) by mouth once daily., Disp: 30 tablet, Rfl: 5    tirzepatide (MOUNJARO) 5 mg/0.5 mL PnIj, Inject 5 mg into the skin every 7 days., Disp: 2 mL, Rfl: 5   Review of Systems   Constitutional:  Negative for appetite change and unexpected weight change.   HENT:  Negative for mouth sores.     Eyes:  Negative for visual disturbance.   Respiratory:  Negative for cough and shortness of breath.    Cardiovascular:  Negative for chest pain.   Gastrointestinal:  Negative for abdominal pain and diarrhea.   Genitourinary:  Negative for frequency.   Musculoskeletal:  Negative for back pain.   Skin:  Negative for rash.   Neurological:  Negative for headaches.   Hematological:  Negative for adenopathy.   Psychiatric/Behavioral:  The patient is not nervous/anxious.             Objective:     There were no vitals filed for this visit.          Physical Exam  Constitutional:       Appearance: Normal appearance.   Pulmonary:      Effort: Pulmonary effort is normal.   Neurological:      General: No focal deficit present.      Mental Status: She is alert.   Psychiatric:         Mood and Affect: Mood normal.         ECOG SCORE            Lab Review:  CBC:   Recent Labs     07/10/25  1205   WBC 6.50   RBC 5.21   HGB 15.7   HCT 45.7           Assessment/Plan:   Erythrocytosis: Most likely smoker's erythrocytosis given her workup             Erythrocytosis: Hct >48% in euvolemic women. -                  This patient was initial workup should  low normal  serum EPO and negative  JAK2 mutation were negative    MPL, CALR, BCR-ABL  also negative        REC     We will discharge back to primary care  Avoid tobacco   If HCT persistently greater than 48 and bun normal    Start ASA     Will consider ABG with carboxyhemoglobin and sleep study first    If still no answer and persistent, then consider repeat evaluation by Hematology       Follow up for discharge from clinic.         Naresh Arriola MD      Telemed: This visit was a telemedicine/telephone visit.  Real-time audio and video were used following [facility] protocols.  The patient and/or family agreed to the security of information at their location.  I was located in the [facility] with this visit occurred.  The patient was located __ at home in the state of  [place] ______________  I am licensed to practice in the state [place]   Total time 20 minutes  > 12 minutes spent in medical discussion and decision-making with the patient review of records

## 2025-07-17 ENCOUNTER — OFFICE VISIT (OUTPATIENT)
Dept: HEMATOLOGY/ONCOLOGY | Facility: CLINIC | Age: 65
End: 2025-07-17
Payer: COMMERCIAL

## 2025-07-17 DIAGNOSIS — D75.1 ERYTHROCYTOSIS: Primary | ICD-10-CM

## 2025-07-17 PROCEDURE — 3066F NEPHROPATHY DOC TX: CPT | Mod: CPTII,95,, | Performed by: INTERNAL MEDICINE

## 2025-07-17 PROCEDURE — 1160F RVW MEDS BY RX/DR IN RCRD: CPT | Mod: CPTII,95,, | Performed by: INTERNAL MEDICINE

## 2025-07-17 PROCEDURE — 98005 SYNCH AUDIO-VIDEO EST LOW 20: CPT | Mod: 95,,, | Performed by: INTERNAL MEDICINE

## 2025-07-17 PROCEDURE — 1159F MED LIST DOCD IN RCRD: CPT | Mod: CPTII,95,, | Performed by: INTERNAL MEDICINE

## 2025-07-17 PROCEDURE — 3061F NEG MICROALBUMINURIA REV: CPT | Mod: CPTII,95,, | Performed by: INTERNAL MEDICINE

## 2025-07-17 PROCEDURE — 3044F HG A1C LEVEL LT 7.0%: CPT | Mod: CPTII,95,, | Performed by: INTERNAL MEDICINE

## 2025-07-25 ENCOUNTER — NUTRITION (OUTPATIENT)
Dept: NUTRITION | Facility: HOSPITAL | Age: 65
End: 2025-07-25
Payer: COMMERCIAL

## 2025-07-25 DIAGNOSIS — E11.9 TYPE 2 DIABETES MELLITUS WITHOUT COMPLICATION, WITHOUT LONG-TERM CURRENT USE OF INSULIN: ICD-10-CM

## 2025-07-25 PROCEDURE — 97802 MEDICAL NUTRITION INDIV IN: CPT

## 2025-07-25 NOTE — LETTER
July 25, 2025        Ben Evans Jr., MD  [address] [address]             [facility]  [address] [address]  Phone: [phone]   Patient: Melvina Pate   MR Number: [id number]   YOB: 1960   Date of Visit: 7/25/2025       Dear Dr. Evans:    Thank you for referring Melvina Pate to me for evaluation. Below are the relevant portions of my assessment and plan of care.            If you have questions, please do not hesitate to call me. I look forward to following Melvina along with you.    Sincerely,      Iman Sifuentes, RD           CC  No Recipients

## 2025-07-25 NOTE — PROGRESS NOTES
Nutrition Note: 2025   Referring Provider: Ben Evans Jr., MD  Reason for visit: Type 2 Diabetes and hyperkalemia  Consultation Time: 60 Minutes     A = NUTRITION ASSESSMENT   Patient Information:    Melvina Pate  : 1960   64 y.o. female    Allergies/Intolerances: Lactose Intolerance  Social Data: lives with spouse/significant Other. Accompanied by .  Anthropometrics:     Wt: 87.2 kg (pt stated; )                                    Ht  5'10 (177.8 cm)  BMI: 27.7 kg/m2  IBW: 68.1 kg (128% IBW)    Nutrition Risk: Patient doesn't meets at least 2 characteristics of the ASPEN/AND criteria for  Malnutrition     Energy Intake [Comment]: 2 meals per day  Interpretation of Weight Loss [Comment]: doesn't meet criteria  Physical Findings (Body Fat) [Comment]: doesn't meet criteria  Physical Findings (Muscle Mass) [Comment]: doesn't meet criteria  Fluid Accumulation [Comment ]: unable assess  Other:    Supplements/Vitamins:    MVI/Supp: Reviewed and yes   Drug/Nutrient interactions: Reviewed and yes  Activity Level:     Active      Form of Activity: 25-30 minutes, 5 days per week. Strength training, yoga, walking     Food/Nutrition-related hx:      Food Security  Is patient able to sufficiently able to prepare meals at home? [x] Yes  [] No []N/A  If no, does patient have help cooking, preparing, and serving meals at home? [] Yes  [] No [x] N/A    Diet/PO Recall:   Appetite: Fair  Fluid Intake: Adequate  Diet Recall:  Breakfast: skips  Lunch: hamburger  Dinner: skips  Snacks: 0-1x/day, mandarin orange, milk  Drinks: water-96 oz per day, 1 pot ice tea, milk  ONS:   N/V/C/D: No GI Symptoms Noted  Eating out: 0-1x/week.   Cultural/Spiritual/Personal Preferences: lactose intolerant   Patient Notes/Reports: 25: Consult for diabetes nutrition education and hyperkalemia. Pt provide dietary recall. See above. Provide examples of low potassium fruits/vegetables to incorporate into meal  plan. Educated carbohydrate counting for meals and snacks, staying around 30-45 gm of carbohydrates per meal and 15-30 gm for snacks, plate method for diabetic diet. Labs/medications review. Potassium noted to 4.9. Pt on hctz and mounjaro. Pt reports not feeling hungry, encourage to consumed 2 meals and 2 snacks. Pt having BM. Will follow up in 3 months. Wt loss with mounjaro, pt stated about 40#.    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     SURAJ (generalized anxiety disorder)     Hypertension     Type 2 diabetes mellitus without complications      Past Surgical History:   Procedure Laterality Date    ARTHROSCOPIC DEBRIDEMENT OF SHOULDER Left 11/30/2022    Procedure: DEBRIDEMENT, SHOULDER, ARTHROSCOPIC;  Surgeon: Itz Curtis MD;  Location: [facility] OR;  Service: Orthopedics;  Laterality: Left;    ARTHROSCOPY OF SHOULDER WITH REMOVAL OF DISTAL CLAVICLE Left 11/30/2022    Procedure: ARTHROSCOPY, SHOULDER, WITH DISTAL CLAVICLE EXCISION;  Surgeon: Itz Curtis MD;  Location: [facility] OR;  Service: Orthopedics;  Laterality: Left;    ARTHROSCOPY,SHOULDER,WITH BICEPS TENODESIS Left 11/30/2022    Procedure: ARTHROSCOPY,SHOULDER,WITH BICEPS TENODESIS;  Surgeon: Itz Curtis MD;  Location: [facility] OR;  Service: Orthopedics;  Laterality: Left;    COLONOSCOPY  05/15/2013    Extraction of wisdom tooth      HYSTERECTOMY  1998    Repair Upper Lip, External Approach (05/18/2018)      Termination of ectopic pregnancy      x 3    TONSILLECTOMY         Family History   Problem Relation Name Age of Onset    Thyroid disease Mother Shantal Pate     Diabetes Mother Shantal Pate     Atrial fibrillation Mother Shantal Pate     Depression Mother Shantal Pate     Osteoarthritis Mother Shantal Pate     Kidney disease Mother Shantal Pate     Mental illness Mother Shantal Pate     Vision loss Mother Shantal Pate          Macular degeneration    Osteoarthritis Father Valerie Pate     Hypertension Father Valerie Pate     Asthma Father Valerie Pate     Coronary artery disease Father Valerie Pate     Arthritis Father Valerie Pate     Heart disease Father Valerie Pate     Depression Sister Sintia Pate     Asthma Sister Sintia Pate     Mental illness Sister Sintia Pate      Social History     Socioeconomic History    Marital status:      Spouse name: Adeel    Number of children: 0   Occupational History    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   Tobacco Use    Smoking status: Former     Current packs/day: 0.00     Average packs/day: 0.5 packs/day for 38.9 years (19.5 ttl pk-yrs)     Types: Cigarettes     Start date: 1/1/1983     Quit date: 12/1/2021     Years since quitting: 3.6    Smokeless tobacco: Never   Substance and Sexual Activity    Alcohol use: Yes     Alcohol/week: 1.0 standard drink of alcohol     Types: 1 Shots of liquor per week     Comment: 3 times per year    Drug use: Never    Sexual activity: Not Currently     Partners: Male     Birth control/protection: Post-menopausal     Social Drivers of Health     Financial Resource Strain: Low Risk  (6/21/2024)    Overall Financial Resource Strain (CARDIA)     Difficulty of Paying Living Expenses: Not hard at all   Food Insecurity: No Food Insecurity (6/21/2024)    Hunger Vital Sign     Worried About Running Out of Food in the Last Year: Never true     Ran Out of Food in the Last Year: Never true   Physical Activity: Insufficiently Active (6/21/2024)    Exercise Vital Sign     Days of Exercise per Week: 5 days     Minutes of Exercise per Session: 20 min   Stress: No Stress Concern Present (6/21/2024)    [employer] [place] [employer] - Occupational Stress Questionnaire     Feeling of Stress : Not at all   Housing Stability: Unknown (6/21/2024)    Housing Stability Vital Sign     Unable to Pay for Housing in the Last Year: No     Current Outpatient  Medications   Medication Instructions    hydroCHLOROthiazide (HYDRODIURIL) 25 mg, Oral, Daily    MOUNJARO 5 mg, Subcutaneous, Every 7 days      Labs:    Lab Results   Component Value Date    WBC 6.50 07/10/2025    HGB 15.7 07/10/2025    HCT 45.7 07/10/2025    CHOL 185 01/10/2025    TRIG 125 01/10/2025    HDL 50 01/10/2025    LDLCALC 110.00 01/10/2025    HGBA1C 5.9 07/10/2025     07/10/2025    K 4.9 07/10/2025    CALCIUM 9.7 07/10/2025         D = NUTRITION DIAGNOSIS    PES Statement:   Primary Problem: Altered nutrition-related lab values potassium related to unknown as evidenced by diet recall.        I = NUTRITION INTERVENTION   Discussed recommended servings of fruit/vegetable per day and food sources of such at age appropriate serving sizes.  Discussed with pt/family the need to ensure regular meals and snacks throughout the day.  Discussed nutrient-dense meals and snacks versus empty-calories.   Answered parents/patient's questions appropriately.      Patient  active and engaged during session and verbalized desire to make changes. Contact information provided, understanding verbalized and compliance expected.   Estimated Energy/Fluid Requirements:   Weight used: CBW 87.2 kg  Calories: 1744 kcal/day (20 kcal/kg )  Protein: 70 g/day (0.8 g/kg )  Fluid: 2180 mL/day (25mL/kg)   Education Materials Provided:   Nutrition Plan   Recommendations:   Create nutrient-dense meals and snacks. Focus on adequate nutrition including lean proteins, whole grains/fiber, and increasing overall fruit/vegetable intake.    Incorporate more foods on low potassium diet   Keep portions appropriate using body (fist, palm, etc)     Ensure balanced eating pattern of 3 meals, 1-2 snacks daily. Avoid skipped meals.       M/E = NUTRITION MONITORING AND EVALUATION   Goal 1: Diet recall shows decrease/improvements in high calorie foods/drinks and consuming balanced eating pattern including lean proteins, whole grains, and  fruit/vegetables by next RD visit.   Indicator: Weight/BMI         F/U:  3 Months    Communication with provider via Epic    Signature: Iman Sifuentes RDN, LDN          [1]   Patient Active Problem List  Diagnosis    Type 2 diabetes mellitus    Osteoarthritis of right wrist    SURAJ (generalized anxiety disorder)    Left hip pain    Chronic pain of both shoulders    Hyperkalemia    Primary osteoarthritis of both knees

## (undated) DEVICE — GLOVE PROTEXIS HYDROGEL SZ9

## (undated) DEVICE — DRAPE STERI U-SHAPED 47X51IN

## (undated) DEVICE — GLOVE 6.5 PROTEXIS PI BLUE

## (undated) DEVICE — KIT ARTHREX ANGEL PRP

## (undated) DEVICE — SLEEVE LATERAL TRACTION ARM

## (undated) DEVICE — DRAPE SURGICAL STERI IRRG PCH

## (undated) DEVICE — SOL NACL IRR 3000ML

## (undated) DEVICE — DRAPE STERI INSTRUMENT 1018

## (undated) DEVICE — COVER MAYO STAND REINFRCD 30

## (undated) DEVICE — TAPE ADH MEDIPORE 4 X 10YDS

## (undated) DEVICE — APPLICATOR CHLORAPREP ORN 26ML

## (undated) DEVICE — GLOVE PROTEXIS HYDROGEL SZ6.5

## (undated) DEVICE — HOOK APOLLORF NON ASPIR 90DEG

## (undated) DEVICE — BENZOIN TINCTURE CAPSULET

## (undated) DEVICE — SET CASSETTE TUBE DW OUTFLOW

## (undated) DEVICE — NDL ANES SPINAL 18X3.5ST 18G

## (undated) DEVICE — PAD ABD 8X10 STERILE

## (undated) DEVICE — PROBE MULTI PORT RF 90 DEGREE

## (undated) DEVICE — BURR OVAL 8 FLUTE 4MMX13CM

## (undated) DEVICE — BLADE COOLCUT EXCALIBER 4X13

## (undated) DEVICE — CUBE COLD THERAPY POLAR PAD

## (undated) DEVICE — SUT MONOCRYL 3-0 PS-2 UND

## (undated) DEVICE — GOWN POLY REINF X-LONG 2XL

## (undated) DEVICE — HOLDER STRIP-T SELF ADH 2X10IN

## (undated) DEVICE — DRAPE SHOULDER BEACH CHAIR

## (undated) DEVICE — KIT SURGICAL TURNOVER

## (undated) DEVICE — GLOVE PROTEXIS BLUE LATEX 9

## (undated) DEVICE — SUT 0 36IN PDS II VIO MONO

## (undated) DEVICE — Device

## (undated) DEVICE — TUBING PUMP ARTHROSCOPY STRL

## (undated) DEVICE — CLOSURE SKIN STERI STRIP 1/2X4

## (undated) DEVICE — CANNULA TRIPLEDAM 6MM X 7CM

## (undated) DEVICE — BLADE SURG CARBON STEEL SZ11

## (undated) DEVICE — GAUZE SPONGE 4'X4 12 PLY

## (undated) DEVICE — DRAPE INCISE IOBAN 2 23X23IN